# Patient Record
Sex: FEMALE | Race: BLACK OR AFRICAN AMERICAN | NOT HISPANIC OR LATINO | Employment: FULL TIME | URBAN - METROPOLITAN AREA
[De-identification: names, ages, dates, MRNs, and addresses within clinical notes are randomized per-mention and may not be internally consistent; named-entity substitution may affect disease eponyms.]

---

## 2018-01-24 ENCOUNTER — OFFICE VISIT - HEALTHEAST (OUTPATIENT)
Dept: FAMILY MEDICINE | Facility: CLINIC | Age: 58
End: 2018-01-24

## 2018-01-24 DIAGNOSIS — Z12.4 CERVICAL CANCER SCREENING: ICD-10-CM

## 2018-01-24 DIAGNOSIS — Z13.1 DIABETES MELLITUS SCREENING: ICD-10-CM

## 2018-01-24 DIAGNOSIS — Z00.00 ROUTINE GENERAL MEDICAL EXAMINATION AT A HEALTH CARE FACILITY: ICD-10-CM

## 2018-01-24 DIAGNOSIS — Z23 NEED FOR IMMUNIZATION AGAINST INFLUENZA: ICD-10-CM

## 2018-01-24 DIAGNOSIS — Z13.220 SCREENING CHOLESTEROL LEVEL: ICD-10-CM

## 2018-01-24 DIAGNOSIS — R03.0 ELEVATED BLOOD-PRESSURE READING WITHOUT DIAGNOSIS OF HYPERTENSION: ICD-10-CM

## 2018-01-24 LAB
CHOLEST SERPL-MCNC: 218 MG/DL
FASTING STATUS PATIENT QL REPORTED: YES
FASTING STATUS PATIENT QL REPORTED: YES
GLUCOSE BLD-MCNC: 96 MG/DL (ref 70–99)
HDLC SERPL-MCNC: 63 MG/DL
LDLC SERPL CALC-MCNC: 141 MG/DL
TRIGL SERPL-MCNC: 72 MG/DL

## 2018-01-24 ASSESSMENT — MIFFLIN-ST. JEOR: SCORE: 1374.7

## 2018-01-25 LAB
HPV SOURCE: ABNORMAL
HUMAN PAPILLOMA VIRUS 16 DNA: NEGATIVE
HUMAN PAPILLOMA VIRUS 18 DNA: NEGATIVE
HUMAN PAPILLOMA VIRUS FINAL DIAGNOSIS: ABNORMAL
HUMAN PAPILLOMA VIRUS OTHER HR: POSITIVE
SPECIMEN DESCRIPTION: ABNORMAL

## 2018-01-30 LAB
BKR LAB AP ABNORMAL BLEEDING: NO
BKR LAB AP BIRTH CONTROL/HORMONES: ABNORMAL
BKR LAB AP CERVICAL APPEARANCE: NORMAL
BKR LAB AP GYN ADEQUACY: ABNORMAL
BKR LAB AP GYN INTERPRETATION: ABNORMAL
BKR LAB AP HPV REFLEX: ABNORMAL
BKR LAB AP LMP: ABNORMAL
BKR LAB AP PATIENT STATUS: ABNORMAL
BKR LAB AP PREVIOUS ABNORMAL: ABNORMAL
BKR LAB AP PREVIOUS NORMAL: 2015
HIGH RISK?: NO
PATH REPORT.COMMENTS IMP SPEC: ABNORMAL
RESULT FLAG (HE HISTORICAL CONVERSION): ABNORMAL

## 2018-01-31 ENCOUNTER — HOSPITAL ENCOUNTER (OUTPATIENT)
Dept: MAMMOGRAPHY | Facility: CLINIC | Age: 58
Discharge: HOME | End: 2018-01-31
Attending: FAMILY MEDICINE

## 2018-01-31 DIAGNOSIS — Z12.31 VISIT FOR SCREENING MAMMOGRAM: ICD-10-CM

## 2018-02-27 ENCOUNTER — AMBULATORY - HEALTHEAST (OUTPATIENT)
Dept: NURSING | Facility: CLINIC | Age: 58
End: 2018-02-27

## 2018-12-27 ENCOUNTER — AMBULATORY - HEALTHEAST (OUTPATIENT)
Dept: NURSING | Facility: CLINIC | Age: 58
End: 2018-12-27

## 2019-01-17 ENCOUNTER — OFFICE VISIT - HEALTHEAST (OUTPATIENT)
Dept: FAMILY MEDICINE | Facility: CLINIC | Age: 59
End: 2019-01-17

## 2019-01-17 DIAGNOSIS — L24.9 IRRITANT CONTACT DERMATITIS, UNSPECIFIED TRIGGER: ICD-10-CM

## 2019-01-17 DIAGNOSIS — M77.11 EPICONDYLITIS, LATERAL, RIGHT: ICD-10-CM

## 2019-02-08 ENCOUNTER — RECORDS - HEALTHEAST (OUTPATIENT)
Dept: ADMINISTRATIVE | Facility: OTHER | Age: 59
End: 2019-02-08

## 2019-05-01 ENCOUNTER — RECORDS - HEALTHEAST (OUTPATIENT)
Dept: ADMINISTRATIVE | Facility: OTHER | Age: 59
End: 2019-05-01

## 2019-05-02 ENCOUNTER — OFFICE VISIT - HEALTHEAST (OUTPATIENT)
Dept: FAMILY MEDICINE | Facility: CLINIC | Age: 59
End: 2019-05-02

## 2019-05-02 DIAGNOSIS — M21.612 BUNION, LEFT: ICD-10-CM

## 2019-05-02 DIAGNOSIS — Z01.818 PREOP GENERAL PHYSICAL EXAM: ICD-10-CM

## 2019-05-02 LAB — HGB BLD-MCNC: 13.7 G/DL (ref 12–16)

## 2019-05-02 ASSESSMENT — MIFFLIN-ST. JEOR: SCORE: 1407.3

## 2020-09-10 ENCOUNTER — RECORDS - HEALTHEAST (OUTPATIENT)
Dept: ADMINISTRATIVE | Facility: OTHER | Age: 60
End: 2020-09-10

## 2020-09-16 ENCOUNTER — OFFICE VISIT - HEALTHEAST (OUTPATIENT)
Dept: FAMILY MEDICINE | Facility: CLINIC | Age: 60
End: 2020-09-16

## 2020-09-16 DIAGNOSIS — Z00.00 ROUTINE GENERAL MEDICAL EXAMINATION AT A HEALTH CARE FACILITY: ICD-10-CM

## 2020-09-16 DIAGNOSIS — R03.0 ELEVATED BLOOD PRESSURE READING WITHOUT DIAGNOSIS OF HYPERTENSION: ICD-10-CM

## 2020-09-16 DIAGNOSIS — C50.911 MALIGNANT NEOPLASM OF RIGHT FEMALE BREAST, UNSPECIFIED ESTROGEN RECEPTOR STATUS, UNSPECIFIED SITE OF BREAST (H): ICD-10-CM

## 2020-09-16 ASSESSMENT — MIFFLIN-ST. JEOR: SCORE: 1390.29

## 2020-09-25 ENCOUNTER — COMMUNICATION - HEALTHEAST (OUTPATIENT)
Dept: FAMILY MEDICINE | Facility: CLINIC | Age: 60
End: 2020-09-25

## 2020-09-25 LAB

## 2020-09-28 ENCOUNTER — COMMUNICATION - HEALTHEAST (OUTPATIENT)
Dept: FAMILY MEDICINE | Facility: CLINIC | Age: 60
End: 2020-09-28

## 2020-10-01 ENCOUNTER — COMMUNICATION - HEALTHEAST (OUTPATIENT)
Dept: FAMILY MEDICINE | Facility: CLINIC | Age: 60
End: 2020-10-01

## 2020-10-15 ENCOUNTER — OFFICE VISIT - HEALTHEAST (OUTPATIENT)
Dept: FAMILY MEDICINE | Facility: CLINIC | Age: 60
End: 2020-10-15

## 2020-10-15 DIAGNOSIS — I10 ESSENTIAL HYPERTENSION: ICD-10-CM

## 2020-10-15 LAB
ALBUMIN SERPL-MCNC: 3.8 G/DL (ref 3.5–5)
ALP SERPL-CCNC: 114 U/L (ref 45–120)
ALT SERPL W P-5'-P-CCNC: 28 U/L (ref 0–45)
ANION GAP SERPL CALCULATED.3IONS-SCNC: 7 MMOL/L (ref 5–18)
AST SERPL W P-5'-P-CCNC: 24 U/L (ref 0–40)
BILIRUB SERPL-MCNC: 0.3 MG/DL (ref 0–1)
BUN SERPL-MCNC: 13 MG/DL (ref 8–22)
CALCIUM SERPL-MCNC: 8.8 MG/DL (ref 8.5–10.5)
CHLORIDE BLD-SCNC: 108 MMOL/L (ref 98–107)
CHOLEST SERPL-MCNC: 225 MG/DL
CO2 SERPL-SCNC: 29 MMOL/L (ref 22–31)
CREAT SERPL-MCNC: 0.73 MG/DL (ref 0.6–1.1)
FASTING STATUS PATIENT QL REPORTED: YES
GFR SERPL CREATININE-BSD FRML MDRD: >60 ML/MIN/1.73M2
GLUCOSE BLD-MCNC: 99 MG/DL (ref 70–125)
HDLC SERPL-MCNC: 62 MG/DL
LDLC SERPL CALC-MCNC: 149 MG/DL
POTASSIUM BLD-SCNC: 4.3 MMOL/L (ref 3.5–5)
PROT SERPL-MCNC: 7.1 G/DL (ref 6–8)
SODIUM SERPL-SCNC: 144 MMOL/L (ref 136–145)
TRIGL SERPL-MCNC: 71 MG/DL
TSH SERPL DL<=0.005 MIU/L-ACNC: 1.83 UIU/ML (ref 0.3–5)

## 2020-11-06 ENCOUNTER — HOSPITAL ENCOUNTER (OUTPATIENT)
Dept: MAMMOGRAPHY | Facility: CLINIC | Age: 60
Discharge: HOME | End: 2020-11-06

## 2020-11-06 DIAGNOSIS — Z12.31 VISIT FOR SCREENING MAMMOGRAM: ICD-10-CM

## 2020-12-03 ENCOUNTER — RECORDS - HEALTHEAST (OUTPATIENT)
Dept: ADMINISTRATIVE | Facility: OTHER | Age: 60
End: 2020-12-03

## 2021-01-12 ENCOUNTER — OFFICE VISIT - HEALTHEAST (OUTPATIENT)
Dept: FAMILY MEDICINE | Facility: CLINIC | Age: 61
End: 2021-01-12

## 2021-01-12 DIAGNOSIS — I10 ESSENTIAL HYPERTENSION: ICD-10-CM

## 2021-02-08 ENCOUNTER — COMMUNICATION - HEALTHEAST (OUTPATIENT)
Dept: FAMILY MEDICINE | Facility: CLINIC | Age: 61
End: 2021-02-08

## 2021-02-08 DIAGNOSIS — I10 ESSENTIAL HYPERTENSION: ICD-10-CM

## 2021-02-10 ENCOUNTER — OFFICE VISIT - HEALTHEAST (OUTPATIENT)
Dept: FAMILY MEDICINE | Facility: CLINIC | Age: 61
End: 2021-02-10

## 2021-02-10 DIAGNOSIS — I10 ESSENTIAL HYPERTENSION: ICD-10-CM

## 2021-02-10 RX ORDER — LOSARTAN POTASSIUM 50 MG/1
50 TABLET ORAL DAILY
Qty: 90 TABLET | Refills: 1 | Status: SHIPPED | OUTPATIENT
Start: 2021-02-10 | End: 2021-08-11

## 2021-03-17 ENCOUNTER — COMMUNICATION - HEALTHEAST (OUTPATIENT)
Dept: FAMILY MEDICINE | Facility: CLINIC | Age: 61
End: 2021-03-17

## 2021-04-02 ENCOUNTER — AMBULATORY - HEALTHEAST (OUTPATIENT)
Dept: NURSING | Facility: CLINIC | Age: 61
End: 2021-04-02

## 2021-04-23 ENCOUNTER — AMBULATORY - HEALTHEAST (OUTPATIENT)
Dept: NURSING | Facility: CLINIC | Age: 61
End: 2021-04-23

## 2021-05-01 ENCOUNTER — HEALTH MAINTENANCE LETTER (OUTPATIENT)
Age: 61
End: 2021-05-01

## 2021-05-17 ENCOUNTER — COMMUNICATION - HEALTHEAST (OUTPATIENT)
Dept: FAMILY MEDICINE | Facility: CLINIC | Age: 61
End: 2021-05-17

## 2021-05-17 DIAGNOSIS — I10 ESSENTIAL HYPERTENSION: ICD-10-CM

## 2021-05-17 RX ORDER — HYDROCHLOROTHIAZIDE 25 MG/1
25 TABLET ORAL DAILY
Qty: 90 TABLET | Refills: 1 | Status: SHIPPED | OUTPATIENT
Start: 2021-05-17 | End: 2021-09-16

## 2021-05-25 ENCOUNTER — RECORDS - HEALTHEAST (OUTPATIENT)
Dept: ADMINISTRATIVE | Facility: CLINIC | Age: 61
End: 2021-05-25

## 2021-05-26 ENCOUNTER — RECORDS - HEALTHEAST (OUTPATIENT)
Dept: ADMINISTRATIVE | Facility: CLINIC | Age: 61
End: 2021-05-26

## 2021-05-27 ENCOUNTER — RECORDS - HEALTHEAST (OUTPATIENT)
Dept: ADMINISTRATIVE | Facility: CLINIC | Age: 61
End: 2021-05-27

## 2021-05-28 ENCOUNTER — RECORDS - HEALTHEAST (OUTPATIENT)
Dept: ADMINISTRATIVE | Facility: CLINIC | Age: 61
End: 2021-05-28

## 2021-05-28 NOTE — PROGRESS NOTES
Preoperative Exam    Scheduled Procedure: bunion left foot  Surgery Date:  5/20/19   [facility] fax [phone]    Surgeon:  Dr.Jeff Young    Assessment/Plan:     Preop general physical exam, Vito, left  -     Hemoglobin  Surgical Procedure Risk: Low (reported cardiac risk generally < 1%)  Have you had prior anesthesia?: Yes  Have you or any family members had a previous anesthesia reaction:  No  Do you or any family members have a history of a clotting or bleeding disorder?: No  Cardiac Risk Assessment: no increased risk for major cardiac complications  Avoid NSAIDs, SAS, fish oil, vitamins    Patient approved for surgery with general or local anesthesia.    Functional Status: Independent  Patient plans to recover at home with family.     Subjective:      Sierra Danielle is a 59 y.o. female who presents for a preoperative consultation.  She has no questions or concerns today.  She had bunion surgery on the right foot many years ago.  She did not have any complications then.  She has had surgeries in the past without any problems with anesthesia or bleeding.  She currently takes no medications.  All her health maintenance screenings are up-to-date.    All other systems reviewed and are negative, other than those listed in the HPI.    Pertinent History  Do you have difficulty breathing or chest pain after walking up a flight of stairs: No  History of obstructive sleep apnea: No  Steroid use in the last 6 months: No  Frequent Aspirin/NSAID use: No  Prior Blood Transfusion: No  Prior Blood Transfusion Reaction: No  If for some reason prior to, during or after the procedure, if it is medically indicated, would you be willing to have a blood transfusion?:  There is no transfusion refusal.    No current outpatient medications on file.     No current facility-administered medications for this visit.         No Known Allergies    Patient Active Problem List   Diagnosis      Allergies     Obesity       Past Medical History:   Diagnosis Date     Breast cancer (H) 2006     Heart murmur        Past Surgical History:   Procedure Laterality Date     BREAST BIOPSY Right 2005     BREAST LUMPECTOMY       CHOLECYSTECTOMY       [place] EXCISE BREAST CYST      Description: Breast Surgery Lumpectomy;  Recorded: 05/01/2012;     [place] LAP,CHOLECYSTECTOMY      Description: Cholecystectomy Laparoscopic;  Recorded: 11/17/2008;       Social History     Socioeconomic History     Marital status:      Spouse name: Not on file     Number of children: Not on file     Years of education: Not on file     Highest education level: Not on file   Occupational History     Not on file   Social Needs     Financial resource strain: Not on file     Food insecurity:     Worry: Not on file     Inability: Not on file     Transportation needs:     Medical: Not on file     Non-medical: Not on file   Tobacco Use     Smoking status: Never Smoker     Smokeless tobacco: Never Used   Substance and Sexual Activity     Alcohol use: Not on file     Drug use: Not on file     Sexual activity: Not on file   Lifestyle     Physical activity:     Days per week: Not on file     Minutes per session: Not on file     Stress: Not on file   Relationships     Social connections:     Talks on phone: Not on file     Gets together: Not on file     Attends Rastafari service: Not on file     Active member of club or organization: Not on file     Attends meetings of clubs or organizations: Not on file     Relationship status: Not on file     Intimate partner violence:     Fear of current or ex partner: Not on file     Emotionally abused: Not on file     Physically abused: Not on file     Forced sexual activity: Not on file   Other Topics Concern     Not on file   Social History Narrative     Not on file       Patient Care Team:  Taryn Cuellar MD as PCP - General          Objective:     Vitals:    05/02/19 1307   BP: 132/84   Pulse: 74  "  SpO2: 99%   Weight: 196 lb (88.9 kg)   Height: 5' 2\" (1.575 m)         Physical Exam:    Objective:    Physical Exam   Vitals:    05/02/19 1307   BP: 132/84   Pulse: 74   SpO2: 99%      Constitutional: Patient is oriented to person, place, and time. Patient appears well-developed and well-nourished. No distress.   Head: Normocephalic and atraumatic.   Right Ear: External ear normal. Normal TM  Left Ear: External ear normal. Normal TM  Nose: Nose normal.   Mouth/Throat: Oropharynx is clear and moist. No oropharyngeal exudate.   Eyes: Conjunctivae and EOM are normal. Pupils are equal, round, and reactive to light. Right eye exhibits no discharge. Left eye exhibits no discharge. No scleral icterus.   Neck: Neck supple. No JVD present. No tracheal deviation present. No thyromegaly present.   Cardiovascular: Normal rate, regular rhythm, normal heart sounds and intact distal pulses. No murmur heard.   Pulmonary/Chest: Effort normal and breath sounds normal. No stridor. No respiratory distress. Patient has no wheezes, no rales, exhibits no tenderness.   Abdominal: Soft. Bowel sounds are normal. Patient exhibits no distension and no mass. There is no tenderness. There is no rebound and no guarding.   Lymphadenopathy:  Patient has no cervical adenopathy.   Neurological: Patient is alert and oriented to person, place, and time. Patient has normal reflexes. No cranial nerve deficit. Coordination normal.   Skin: Skin is warm and dry. No rash noted. Patient is not diaphoretic. No erythema. No pallor.   Bunion left foot    Results for orders placed or performed in visit on 05/02/19   Hemoglobin   Result Value Ref Range    Hemoglobin 13.7 12.0 - 16.0 g/dL      Immunization History   Administered Date(s) Administered     DT (pediatric) 08/12/2004     INFLUENZA,RECOMBINANT,INJ,PF QUADRIVALENT 18+YRS 12/27/2018     Influenza, seasonal,quad inj 36+ mos 01/24/2018     Td,adult,historic,unspecified 08/12/2004     Tdap 01/14/2014 "       Electronically signed by Taryn Velarde MD 05/02/19 1:03 PM

## 2021-05-29 ENCOUNTER — RECORDS - HEALTHEAST (OUTPATIENT)
Dept: ADMINISTRATIVE | Facility: CLINIC | Age: 61
End: 2021-05-29

## 2021-05-30 ENCOUNTER — RECORDS - HEALTHEAST (OUTPATIENT)
Dept: ADMINISTRATIVE | Facility: CLINIC | Age: 61
End: 2021-05-30

## 2021-05-31 ENCOUNTER — RECORDS - HEALTHEAST (OUTPATIENT)
Dept: ADMINISTRATIVE | Facility: CLINIC | Age: 61
End: 2021-05-31

## 2021-05-31 VITALS — BODY MASS INDEX: 35.07 KG/M2 | HEIGHT: 62 IN | WEIGHT: 190.56 LBS

## 2021-06-03 VITALS — HEIGHT: 62 IN | WEIGHT: 196 LBS | BODY MASS INDEX: 36.07 KG/M2

## 2021-06-05 VITALS
WEIGHT: 194.44 LBS | SYSTOLIC BLOOD PRESSURE: 154 MMHG | DIASTOLIC BLOOD PRESSURE: 94 MMHG | HEART RATE: 82 BPM | OXYGEN SATURATION: 98 % | BODY MASS INDEX: 36.14 KG/M2

## 2021-06-05 VITALS
SYSTOLIC BLOOD PRESSURE: 150 MMHG | HEART RATE: 89 BPM | HEIGHT: 62 IN | BODY MASS INDEX: 35.7 KG/M2 | WEIGHT: 194 LBS | OXYGEN SATURATION: 98 % | DIASTOLIC BLOOD PRESSURE: 88 MMHG

## 2021-06-05 VITALS
WEIGHT: 191.31 LBS | SYSTOLIC BLOOD PRESSURE: 140 MMHG | OXYGEN SATURATION: 99 % | DIASTOLIC BLOOD PRESSURE: 108 MMHG | HEART RATE: 71 BPM | TEMPERATURE: 98.5 F | BODY MASS INDEX: 35.56 KG/M2

## 2021-06-05 VITALS
BODY MASS INDEX: 36.45 KG/M2 | WEIGHT: 196.06 LBS | OXYGEN SATURATION: 99 % | TEMPERATURE: 98.3 F | HEART RATE: 78 BPM | DIASTOLIC BLOOD PRESSURE: 102 MMHG | SYSTOLIC BLOOD PRESSURE: 152 MMHG

## 2021-06-11 NOTE — PROGRESS NOTES
1/24/18 ASCUS, +HR HPV (not 16/18)  9/16/20 NIL pap, +HR HPV (not 16/18). Plan: cotest in 1 year per provider

## 2021-06-12 NOTE — PROGRESS NOTES
ASSESSMENT/PLAN:  Sierra was seen today for follow-up and bloodwork.    Diagnoses and all orders for this visit:    Essential hypertension  -     Lipid Cascade  -     Comprehensive Metabolic Panel  -     Thyroid Cascade  -     lisinopriL (PRINIVIL,ZESTRIL) 10 MG tablet; Take 1 tablet (10 mg total) by mouth daily.  Counseled healthy lifestyle modifications  Discussed intermittent fasting  F/u in 3 months    SUBJECTIVE:    Sierra Danielle is a 60 y.o. female who came in today     Here for BP recheck  Since last appointment she has eaten out less and cooking more.  Limiting salt intake.  Has lost few lbs  FMH of HTN  No chest pain, shortness of breath, HA    Review of Systems (except those mentioned above)  Constitutional: Negative.   HENT: Negative.   Eyes: Negative.   Respiratory: Negative.   Cardiovascular: Negative.   Gastrointestinal: Negative.   Endocrine: Negative.   Genitourinary: Negative.   Musculoskeletal: Negative.   Skin: Negative.   Allergic/Immunologic: Negative.   Neurological: Negative.   Hematological: Negative.   Psychiatric/Behavioral: Negative.     Patient Active Problem List    Diagnosis Date Noted     breast cancer right, s/p lumpectomy 09/16/2020     ASCUS with positive high risk HPV cervical 01/24/2018     Allergies      No Known Allergies  Current Outpatient Medications   Medication Sig Dispense Refill     lisinopriL (PRINIVIL,ZESTRIL) 10 MG tablet Take 1 tablet (10 mg total) by mouth daily. 90 tablet 0     No current facility-administered medications for this visit.      Past Medical History:   Diagnosis Date     ASCUS with positive high risk HPV cervical 1/24/2018     Breast cancer (H) 2006     Heart murmur      Past Surgical History:   Procedure Laterality Date     BREAST BIOPSY Right 2005     BREAST LUMPECTOMY       CHOLECYSTECTOMY       [place] EXCISE BREAST CYST      Description: Breast Surgery Lumpectomy;  Recorded: 05/01/2012;     [place] LAP,CHOLECYSTECTOMY      Description:  Cholecystectomy Laparoscopic;  Recorded: 11/17/2008;     Social History     Socioeconomic History     Marital status:      Spouse name: None     Number of children: None     Years of education: None     Highest education level: None   Occupational History     None   Social Needs     Financial resource strain: None     Food insecurity     Worry: None     Inability: None     Transportation needs     Medical: None     Non-medical: None   Tobacco Use     Smoking status: Never Smoker     Smokeless tobacco: Never Used   Substance and Sexual Activity     Alcohol use: None     Drug use: None     Sexual activity: None   Lifestyle     Physical activity     Days per week: None     Minutes per session: None     Stress: None   Relationships     Social connections     Talks on phone: None     Gets together: None     Attends Latter-day service: None     Active member of club or organization: None     Attends meetings of clubs or organizations: None     Relationship status: None     Intimate partner violence     Fear of current or ex partner: None     Emotionally abused: None     Physically abused: None     Forced sexual activity: None   Other Topics Concern     None   Social History Narrative     None     Family History   Problem Relation Age of Onset     Breast cancer Paternal Aunt 60     Coronary artery disease Father      Pancreatic cancer Mother          OBJECTIVE:    Vitals:    10/15/20 0821 10/15/20 0853   BP: (!) 144/100 (!) 140/108   Patient Site: Left Arm    Patient Position: Sitting    Cuff Size: Adult Regular    Pulse: 71    Temp: 98.5  F (36.9  C)    SpO2: 99%    Weight: 191 lb 5 oz (86.8 kg)      Body mass index is 35.56 kg/m .    Physical Exam:  Constitutional: Patient is oriented to person, place, and time. Patient appears well-developed and well-nourished. No distress.   Head: Normocephalic and atraumatic.   Right Ear: External ear normal.   Left Ear: External ear normal.   Nose: Nose normal.   Eyes:  Conjunctivae and EOM are normal. Right eye exhibits no discharge. Left eye exhibits no discharge. No scleral icterus.   Neurological: Patient is alert and oriented to person, place, and time. No cranial nerve deficit. Coordination normal.   Skin: No rash noted. Patient is not diaphoretic. No erythema. No pallor.    Results for orders placed or performed in visit on 09/16/20   Gynecologic Cytology (PAP Smear)   Result Value Ref Range    Case Report       Gynecologic Cytology Report                       Case: [id number]                                   Authorizing Provider:  Taryn Cuellar       Collected:           09/16/2020 0941                                     MD Chrissy                                                                  Ordering Location:     [facility]   Received:            09/16/2020 0941                                     Family Medicine/OB                                                           First Screen:          Naomie Shipman CT                                                                               (ASCP)                                                                       Rescreen:              Alexus Aguilar CT                                                                           (ASCP)                                                                       Specimen:    SUREPATH PAP, SCREENING, Endocervical/cervical                                             Interpretation  Negative for squamous intraepithelial lesion or malignancy.      Negative for squamous intraepithelial lesion or malignancy    Result Flag Normal Normal    Other Findings       Shift in vaginal yamila suggestive of bacterial vaginosis    Specimen Adequacy       Satisfactory for evaluation, endocervical/transformation zone component present    HPV Reflex? Yes regardless of result     HIGH RISK No     LMP/Menopause Date postmenopausal     Abnormal Bleeding No     Pt  Status na     Birth Control/Hormones None     Previous Normal/Date .     Prev Abn Date/Dx 2018     Cervical Appearance normal    HPV High Risk DNA Cervical   Result Value Ref Range    HPV Source [facility]     HPV16 DNA Negative NEG    HPV18 DNA Negative NEG    Other HR HPV Positive (!) NEG    Final Diagnosis SEE NOTES     Specimen Description Cervical Cells

## 2021-06-14 NOTE — PROGRESS NOTES
ASSESSMENT/PLAN:  Sierra was seen today for med check and medication refill.    Diagnoses and all orders for this visit:    Essential hypertension  Stop lisinopril due to cough  Start hydrochlorothiazide 25mg  Check BP at home  F/u in 1 month  -     hydroCHLOROthiazide (HYDRODIURIL) 25 MG tablet; Take 1 tablet (25 mg total) by mouth daily.  -     blood pressure monitor Kit; Check blood pressure daily      SUBJECTIVE:    Sierra Danielle is a 60 y.o. female who came in today     Here for HTN f/u  Started lisinopril in October  Tickle in the throat and cough.  Associated HA.  No chest pain  No SOB    Review of Systems (except those mentioned above)  Constitutional: Negative.   HENT: Negative.   Eyes: Negative.   Respiratory: Negative.   Cardiovascular: Negative.   Gastrointestinal: Negative.   Endocrine: Negative.   Genitourinary: Negative.   Musculoskeletal: Negative.   Skin: Negative.   Allergic/Immunologic: Negative.   Neurological: Negative.   Hematological: Negative.   Psychiatric/Behavioral: Negative.     Patient Active Problem List    Diagnosis Date Noted     breast cancer right, s/p lumpectomy 09/16/2020     ASCUS with positive high risk HPV cervical 01/24/2018     Allergies      No Known Allergies  Current Outpatient Medications   Medication Sig Dispense Refill     lisinopriL (PRINIVIL,ZESTRIL) 10 MG tablet Take 1 tablet (10 mg total) by mouth daily. 90 tablet 0     blood pressure monitor Kit Check blood pressure daily 1 each 0     hydroCHLOROthiazide (HYDRODIURIL) 25 MG tablet Take 1 tablet (25 mg total) by mouth daily. 30 tablet 0     No current facility-administered medications for this visit.      Past Medical History:   Diagnosis Date     ASCUS with positive high risk HPV cervical 1/24/2018     Breast cancer (H) 2006     Heart murmur      Past Surgical History:   Procedure Laterality Date     BREAST BIOPSY Right 2005     BREAST LUMPECTOMY       CHOLECYSTECTOMY       [place] EXCISE BREAST CYST       Description: Breast Surgery Lumpectomy;  Recorded: 05/01/2012;     [place] LAP,CHOLECYSTECTOMY      Description: Cholecystectomy Laparoscopic;  Recorded: 11/17/2008;     Social History     Socioeconomic History     Marital status:      Spouse name: None     Number of children: None     Years of education: None     Highest education level: None   Occupational History     None   Social Needs     Financial resource strain: None     Food insecurity     Worry: None     Inability: None     Transportation needs     Medical: None     Non-medical: None   Tobacco Use     Smoking status: Never Smoker     Smokeless tobacco: Never Used   Substance and Sexual Activity     Alcohol use: None     Drug use: None     Sexual activity: None   Lifestyle     Physical activity     Days per week: None     Minutes per session: None     Stress: None   Relationships     Social connections     Talks on phone: None     Gets together: None     Attends Temple service: None     Active member of club or organization: None     Attends meetings of clubs or organizations: None     Relationship status: None     Intimate partner violence     Fear of current or ex partner: None     Emotionally abused: None     Physically abused: None     Forced sexual activity: None   Other Topics Concern     None   Social History Narrative     None     Family History   Problem Relation Age of Onset     Breast cancer Paternal Aunt 60     Coronary artery disease Father      Pancreatic cancer Mother          OBJECTIVE:    Vitals:    01/12/21 0930 01/12/21 0949   BP: (!) 152/102 (!) 152/102   Patient Site: Left Arm Left Arm   Patient Position: Sitting Sitting   Cuff Size: Adult Regular Adult Regular   Pulse: 78    Temp: 98.3  F (36.8  C)    TempSrc: Oral    SpO2: 99%    Weight: 196 lb 1 oz (88.9 kg)      Body mass index is 36.45 kg/m .    Physical Exam:  Constitutional: Patient is oriented to person, place, and time. Patient appears well-developed and well-nourished. No  distress.   Head: Normocephalic and atraumatic.   Right Ear: External ear normal.   Left Ear: External ear normal.   Nose: Nose normal.   Eyes: Conjunctivae and EOM are normal. Right eye exhibits no discharge. Left eye exhibits no discharge. No scleral icterus.   Neurological: Patient is alert and oriented to person, place, and time. No cranial nerve deficit. Coordination normal.   Skin: No rash noted. Patient is not diaphoretic. No erythema. No pallor.      Results for orders placed or performed in visit on 10/15/20   Lipid Cascade   Result Value Ref Range    Cholesterol 225 (H) <=199 mg/dL    Triglycerides 71 <=149 mg/dL    HDL Cholesterol 62 >=50 mg/dL    LDL Calculated 149 (H) <=129 mg/dL    Patient Fasting > 8hrs? Yes    Comprehensive Metabolic Panel   Result Value Ref Range    Sodium 144 136 - 145 mmol/L    Potassium 4.3 3.5 - 5.0 mmol/L    Chloride 108 (H) 98 - 107 mmol/L    CO2 29 22 - 31 mmol/L    Anion Gap, Calculation 7 5 - 18 mmol/L    Glucose 99 70 - 125 mg/dL    BUN 13 8 - 22 mg/dL    Creatinine 0.73 0.60 - 1.10 mg/dL    GFR MDRD Af Amer >60 >60 mL/min/1.73m2    GFR MDRD Non Af Amer >60 >60 mL/min/1.73m2    Bilirubin, Total 0.3 0.0 - 1.0 mg/dL    Calcium 8.8 8.5 - 10.5 mg/dL    Protein, Total 7.1 6.0 - 8.0 g/dL    Albumin 3.8 3.5 - 5.0 g/dL    Alkaline Phosphatase 114 45 - 120 U/L    AST 24 0 - 40 U/L    ALT 28 0 - 45 U/L   Thyroid Cascade   Result Value Ref Range    TSH 1.83 0.30 - 5.00 uIU/mL

## 2021-06-15 NOTE — PROGRESS NOTES
ASSESSMENT/PLAN:  Sierra was seen today for med check and medication refill.    Diagnoses and all orders for this visit:    Essential hypertension  Cough is better since discontinuation of lisinopril  Blood pressure still elevated  Add losartan 50 mg  Continue with hydrochlorothiazide 25 mg  Check blood pressure at home and call if her numbers are consistently above 140/90.  Follow-up in 6 months  Counseled healthy lifestyle modifications  Motivational interviewing was utilized today.  Modified cognitive behavioral therapy was performed with counseling on internal locus of control.  Discussed importance of self care, sleep, nutrition, hydration, exercise, and mindfulness      -     losartan (COZAAR) 50 MG tablet; Take 1 tablet (50 mg total) by mouth daily.  -     hydroCHLOROthiazide (HYDRODIURIL) 25 MG tablet; Take 1 tablet (25 mg total) by mouth daily.    SUBJECTIVE:    Sierra Danielle is a 60 y.o. female who came in today     Here for blood pressure check.  Cough is better since we discontinue lisinopril.  She is on hydrochlorothiazide 25 mg.  Other than frequent urination and mild headaches, she is tolerating hydrochlorothiazide.  Blood pressure still elevated.  Has been working on healthy lifestyle modification.  She is cooking at home rather than going out to eat.  Working on psychotherapy with regards to her stressors.    Review of Systems (except those mentioned above)  Constitutional: Negative.   HENT: Negative.   Eyes: Negative.   Respiratory: Negative.   Cardiovascular: Negative.   Gastrointestinal: Negative.   Endocrine: Negative.   Genitourinary: Negative.   Musculoskeletal: Negative.   Skin: Negative.   Allergic/Immunologic: Negative.   Neurological: Negative.   Hematological: Negative.   Psychiatric/Behavioral: Negative.     Patient Active Problem List    Diagnosis Date Noted     Essential hypertension 02/10/2021     breast cancer right, s/p lumpectomy 09/16/2020     ASCUS with positive high risk  HPV cervical 01/24/2018     Allergies      No Known Allergies  Current Outpatient Medications   Medication Sig Dispense Refill     hydroCHLOROthiazide (HYDRODIURIL) 25 MG tablet Take 1 tablet (25 mg total) by mouth daily. 90 tablet 1     losartan (COZAAR) 50 MG tablet Take 1 tablet (50 mg total) by mouth daily. 90 tablet 1     No current facility-administered medications for this visit.      Past Medical History:   Diagnosis Date     ASCUS with positive high risk HPV cervical 1/24/2018     Breast cancer (H) 2006     Heart murmur      Past Surgical History:   Procedure Laterality Date     BREAST BIOPSY Right 2005     BREAST LUMPECTOMY       CHOLECYSTECTOMY       [place] EXCISE BREAST CYST      Description: Breast Surgery Lumpectomy;  Recorded: 05/01/2012;     [place] LAP,CHOLECYSTECTOMY      Description: Cholecystectomy Laparoscopic;  Recorded: 11/17/2008;     Social History     Socioeconomic History     Marital status:      Spouse name: None     Number of children: None     Years of education: None     Highest education level: None   Occupational History     None   Social Needs     Financial resource strain: None     Food insecurity     Worry: None     Inability: None     Transportation needs     Medical: None     Non-medical: None   Tobacco Use     Smoking status: Never Smoker     Smokeless tobacco: Never Used   Substance and Sexual Activity     Alcohol use: None     Drug use: None     Sexual activity: None   Lifestyle     Physical activity     Days per week: None     Minutes per session: None     Stress: None   Relationships     Social connections     Talks on phone: None     Gets together: None     Attends Holiness service: None     Active member of club or organization: None     Attends meetings of clubs or organizations: None     Relationship status: None     Intimate partner violence     Fear of current or ex partner: None     Emotionally abused: None     Physically abused: None     Forced sexual activity: None    Other Topics Concern     None   Social History Narrative     None     Family History   Problem Relation Age of Onset     Breast cancer Paternal Aunt 60     Coronary artery disease Father      Pancreatic cancer Mother          OBJECTIVE:    Vitals:    02/10/21 1338 02/10/21 1356   BP: (!) 148/92 (!) 154/94   Patient Site: Left Arm Left Arm   Patient Position: Sitting Sitting   Cuff Size: Adult Regular Adult Regular   Pulse: 82    SpO2: 98%    Weight: 194 lb 7 oz (88.2 kg)      Body mass index is 36.14 kg/m .    Physical Exam:  Constitutional: Patient is oriented to person, place, and time. Patient appears well-developed and well-nourished. No distress.   Head: Normocephalic and atraumatic.   Right Ear: External ear normal.   Left Ear: External ear normal.   Eyes: Conjunctivae and EOM are normal. Right eye exhibits no discharge. Left eye exhibits no discharge. No scleral icterus.   Neurological: Patient is alert and oriented to person, place, and time. No cranial nerve deficit. Coordination normal.   Skin: No rash noted. Patient is not diaphoretic. No erythema. No pallor.    Results for orders placed or performed in visit on 10/15/20   Lipid Cascade   Result Value Ref Range    Cholesterol 225 (H) <=199 mg/dL    Triglycerides 71 <=149 mg/dL    HDL Cholesterol 62 >=50 mg/dL    LDL Calculated 149 (H) <=129 mg/dL    Patient Fasting > 8hrs? Yes    Comprehensive Metabolic Panel   Result Value Ref Range    Sodium 144 136 - 145 mmol/L    Potassium 4.3 3.5 - 5.0 mmol/L    Chloride 108 (H) 98 - 107 mmol/L    CO2 29 22 - 31 mmol/L    Anion Gap, Calculation 7 5 - 18 mmol/L    Glucose 99 70 - 125 mg/dL    BUN 13 8 - 22 mg/dL    Creatinine 0.73 0.60 - 1.10 mg/dL    GFR MDRD Af Amer >60 >60 mL/min/1.73m2    GFR MDRD Non Af Amer >60 >60 mL/min/1.73m2    Bilirubin, Total 0.3 0.0 - 1.0 mg/dL    Calcium 8.8 8.5 - 10.5 mg/dL    Protein, Total 7.1 6.0 - 8.0 g/dL    Albumin 3.8 3.5 - 5.0 g/dL    Alkaline Phosphatase 114 45 - 120 U/L     AST 24 0 - 40 U/L    ALT 28 0 - 45 U/L   Thyroid Cascade   Result Value Ref Range    TSH 1.83 0.30 - 5.00 uIU/mL

## 2021-06-15 NOTE — PROGRESS NOTES
ASSESSMENT/PLAN:  Routine general medical examination at a health care facility  We discussed healthy lifestyle, nutrition, cardiovascular risk reduction, self care, safety, sunscreen, and seatbelt use.  Health maintenance screening and immunizations reviewed with the patient.    Screening cholesterol level  -     Lipid Cascade    Diabetes mellitus screening  -     Glucose    Cervical cancer screening  -     Gynecologic Cytology (PAP Smear)    Need for immunization against influenza  -     Influenza, Seasonal,Quad Inj, 36+ MOS    Elevated blood-pressure reading without diagnosis of hypertension  Come back in 2 wks for a nurse only BP recheck  Work on healthy lifestyle modifications        CHIEF COMPLAINT:  Chief Complaint   Patient presents with     Annual Exam     Px, Pt is fasting       SUBJECTIVE:  Sierra Danielle is a 57 y.o. female who is here for a health maintenance visit. Her blood pressure is elevated today at 130/100. Her pulse is within normal limits. Her BMI is 35. She is fasting today. She is due for a PAP smear; no history of abnormal PAP smear. She is due for a mammogram. Her colonoscopy is up to date. Immunizations are up to date. She would like a flu shot today.      REVIEW OF SYSTEMS:   No more problems with chest pain since 2016. Working on starting new eating habits, and healthy lifestyle. All other systems are negative.    PFSH:  No new history.     History   Smoking Status     Never Smoker   Smokeless Tobacco     Never Used       Family History   Problem Relation Age of Onset     Breast cancer Paternal Aunt 60     Coronary artery disease Father      Pancreatic cancer Mother        Past Surgical History:   Procedure Laterality Date     BREAST BIOPSY Right 2005     BREAST LUMPECTOMY       CHOLECYSTECTOMY       RI EXCISE BREAST CYST      Description: Breast Surgery Lumpectomy;  Recorded: 05/01/2012;     RI LAP,CHOLECYSTECTOMY      Description: Cholecystectomy Laparoscopic;  Recorded: 11/17/2008;  "      No Known Allergies      OBJECTIVE:   Physical Exam   Vitals:    01/24/18 0908 01/24/18 0939   BP: (!) 130/100 (!) 136/100   Pulse: 72    Weight: 190 lb 9 oz (86.4 kg)    Height: 5' 1.5\" (1.562 m)      Estimated body mass index is 35.42 kg/(m^2) as calculated from the following:    Height as of this encounter: 5' 1.5\" (1.562 m).    Weight as of this encounter: 190 lb 9 oz (86.4 kg).    Constitutional: Patient is oriented to person, place, and time. Patient appears well-developed and well-nourished. No distress.   Head: Normocephalic and atraumatic.   Right Ear: External ear normal. Ear canal and TM normal.   Left Ear: External ear normal. Ear canal and TM normal.   Nose: Nose normal.   Mouth/Throat: Oropharynx is clear and moist. No oropharyngeal exudate.   Eyes: Conjunctivae and EOM are normal. Pupils are equal, round, and reactive to light. Right eye exhibits no discharge. Left eye exhibits no discharge. No scleral icterus.   Neck: Neck supple. No JVD present. No tracheal deviation present. No thyromegaly present.   Breasts:  Normal appearing, no skin involvement, no palpable mass, no tenderness on palpation.  No axillary involvement  Cardiovascular: Normal rate, regular rhythm, normal heart sounds and intact distal pulses. No murmur heard.   Pulmonary/Chest: Effort normal and breath sounds normal. No stridor. No respiratory distress. Patient has no wheezes, no rales, exhibits no tenderness.   Abdominal: Soft. Bowel sounds are normal. Patient exhibits no distension and no mass. There is no tenderness. There is no rebound and no guarding.   Lymphadenopathy:  Patient has no cervical adenopathy.   Neurological: Patient is alert and oriented to person, place, and time. Patient has normal reflexes. No cranial nerve deficit. Coordination normal.   Skin: Skin is warm and dry. No rash noted. Patient is not diaphoretic. No erythema. No pallor.   Pelvic:  Normal external genitalia with Normal vulva.  Normal vagina with " no polyps or lesions and with physiologic discharge.  Normal cervix with normal mucosa and without CMT.  No adnexal masses  Psychiatric: Patient has good eye contact without any psychomotor retardation or stereotypic behaviors.  normal mood and affect. Judgment and thought content normal.   Speech is regular rate and rhythm.       QUALITY MEASURES:  The following high BMI interventions were performed this visit: encouragement to exercise and lifestyle education regarding diet    ADDITIONAL HISTORY SUMMARIZED (2): Reviewed normal colonoscopy from 2012.  DECISION TO OBTAIN EXTRA INFORMATION (1): None.   RADIOLOGY TESTS (1): None.  LABS (1): Ordered labs today.  MEDICINE TESTS (1): Reviewed normal echocardiogram from 9/14/2016.  INDEPENDENT REVIEW (2 each): None.     The visit lasted a total of 17 minutes face to face with the patient. Over 50% of the time was spent counseling and educating the patient about health maintenance.    I, Kaylah Sanchez, am scribing for and in the presence of, Dr. Dukes.    ITaryn MD , personally performed the services described in this documentation, as scribed by Kaylah Sanchez in my presence, and it is both accurate and complete.      MEDICATIONS:  No current outpatient prescriptions on file.     No current facility-administered medications for this visit.          Total data points: 4

## 2021-06-15 NOTE — TELEPHONE ENCOUNTER
Refill request for medication: Hydrochlorothiazide  Last visit addressing this medication: 1/12/2021  Follow up plan 1  months  Last refill on 1/12/21, quantity #30   CSA completed N/A   checked  02/08/21, last dispensed refill N/A    Appointment: Appointment scheduled for 2/10/21     Luanne Smith, CMA

## 2021-06-16 PROBLEM — C50.911 MALIGNANT NEOPLASM OF RIGHT FEMALE BREAST, UNSPECIFIED ESTROGEN RECEPTOR STATUS, UNSPECIFIED SITE OF BREAST (H): Status: ACTIVE | Noted: 2020-09-16

## 2021-06-16 PROBLEM — I10 ESSENTIAL HYPERTENSION: Status: ACTIVE | Noted: 2021-02-10

## 2021-06-16 PROBLEM — R87.810 ASCUS WITH POSITIVE HIGH RISK HPV CERVICAL: Status: ACTIVE | Noted: 2018-01-24

## 2021-06-16 PROBLEM — R87.610 ASCUS WITH POSITIVE HIGH RISK HPV CERVICAL: Status: ACTIVE | Noted: 2018-01-24

## 2021-06-16 NOTE — PROGRESS NOTES
I met with Sierra Danielle at the request of Roseline to recheck her blood pressure.  Blood pressure medications on the MAR were reviewed with patient.    Patient has taken all medications as per usual regimen: N/A  Patient reports tolerating them without any issues or concerns: N/A    Vitals:    02/27/18 0818   BP: 132/76       Blood pressure was taken, previous encounter was reviewed, recorded blood pressure below 140/90.  Patient was discharged and the note will be sent to the provider for final review.

## 2021-06-20 NOTE — LETTER
Letter by Taryn Cuellar MD at      Author: Taryn Cuellar MD Service: -- Author Type: --    Filed:  Encounter Date: 10/1/2020 Status: (Other)         Sierra VÍCTOR Lolis  [address]  [facility] [address]             October 1, 2020         Dear Ms. Danielle,    This letter is regarding your recent Pap smear and Human Papillomavirus (HPV) test.    Your results showed a normal Pap smear and HPV positive.     About 80 percent of women have been exposed to HPV throughout their lifetime. There is no medication for the treatment of HPV. Typically, your own immune system gets rid of the virus before it does harm. HPV is spread by direct skin-to-skin contact, including sexual intercourse, oral sex, anal sex, or any other contact involving the genital area (example: hand to genital contact). It is not possible to become infected with HPV by touching an object, such as a toilet seat. Most people who are infected with HPV have no signs or symptoms.    Things that you can do to boost your immune system and help your body get rid of HPV: get plenty of rest, eat a well-balanced diet of healthy foods, stop smoking, exercise regularly and decrease stress.    It is recommended that you have your next Pap smear and Human Papillomavirus (HPV) test in 1 year.    If you have additional questions regarding this result, please contact our Registered Nurse, , at [phone].      Sincerely,    Your [facility] Team

## 2021-06-23 NOTE — PROGRESS NOTES
"ASSESSMENT/PLAN:    Epicondylitis, lateral, right  Offered her an elbow brace  Discussed modified use of the elbow and arm  Ice, OTC analgesics prn  F/u if not better in 1 month    Irritant contact dermatitis, unspecified trigger  Advised skin hydration and prn hydrocortisone    SUBJECTIVE:    Sierra Danielle is a 58 y.o. female who came in today complaining of a rash near her eye and \"tennis elbow.\"   Patient lifted something too heavy or the wrong way with her right arm on 12/7/2018. She has had pain since then and her ROM and ability to lift things is being impacted. Complete extension of her elbow hurts, flexion hurts, and twisting her elbow hurts. She has broken a plate because her  is also being effected by the pain. Pt uses Ibuprofen sparingly for pain relief.  Patient has a rash near her eye on the right side that began 3 days ago. It started small but has spread since then. The rash is itchy/burning. She has used Hydrocortisone cream which is helping the rash improve.     Review of Systems (except those mentioned above)  Constitutional: Negative.   HENT: Negative.   Eyes: Negative.   Respiratory: Negative.   Cardiovascular: Negative.   Gastrointestinal: Negative.   Endocrine: Negative.   Genitourinary: Negative.   Musculoskeletal: Negative.   Skin: Negative.   Allergic/Immunologic: Negative.   Neurological: Negative.   Hematological: Negative.   Psychiatric/Behavioral: Negative.     Patient Active Problem List    Diagnosis Date Noted     Allergies      Obesity      No Known Allergies  No current outpatient medications on file.     No current facility-administered medications for this visit.      Past Medical History:   Diagnosis Date     Breast cancer (H) 2006     Heart murmur      Past Surgical History:   Procedure Laterality Date     BREAST BIOPSY Right 2005     BREAST LUMPECTOMY       CHOLECYSTECTOMY       RI EXCISE BREAST CYST      Description: Breast Surgery Lumpectomy;  Recorded: 05/01/2012;     " OR LAP,CHOLECYSTECTOMY      Description: Cholecystectomy Laparoscopic;  Recorded: 11/17/2008;     Social History     Socioeconomic History     Marital status:      Spouse name: None     Number of children: None     Years of education: None     Highest education level: None   Social Needs     Financial resource strain: None     Food insecurity - worry: None     Food insecurity - inability: None     Transportation needs - medical: None     Transportation needs - non-medical: None   Occupational History     None   Tobacco Use     Smoking status: Never Smoker     Smokeless tobacco: Never Used   Substance and Sexual Activity     Alcohol use: None     Drug use: None     Sexual activity: None   Other Topics Concern     None   Social History Narrative     None     Family History   Problem Relation Age of Onset     Breast cancer Paternal Aunt 60     Coronary artery disease Father      Pancreatic cancer Mother      OBJECTIVE:    Vitals:    01/17/19 1321   BP: 120/84   Pulse: 80       Physical Exam:  Constitutional: Patient was oriented to person, place, and time. Patient appeared well-developed and well-nourished. No distress.   Head: Normocephalic and atraumatic.   Right Ear: External ear normal.   Left Ear: External ear normal.   Nose: Nose normal.   Eyes: Conjunctivae and EOM were normal. Right eye exhibited no discharge. Left eye exhibited no discharge. No scleral icterus.   Skin: Skin was warm and dry. No rash noted. Patient was not diaphoretic. No erythema. No pallor. Erythematous slightly raised patch on the temporal right side of the face.   Extremities (Right Elbow): No edema, no ecchymosis, no deformity. Tenderness to palpation of the lateral epicondyl of the right elbow. There is ROM with pain.        ADDITIONAL HISTORY SUMMARIZED (2): None.  DECISION TO OBTAIN EXTRA INFORMATION (1): None.   RADIOLOGY TESTS (1): None.  LABS (1): None.  MEDICINE TESTS (1): None.  INDEPENDENT REVIEW (2 each): None.     Total  data points = 0    Total time was 8 minutes additional to the preventive, greater than 50% counseling and coordinating care regarding facial rash and tennis elbow.    By signing my name below, I, Falguni Dye, attest that this documentation has been prepared under the direction and in the presence of Dr. Chrissy Dukes.  Electronic Signature: Corby Hilliard. 01/17/2019 13:41.    I, Dr. Taryn Velarde MD , personally performed the services described in this documentation. All medical record entries made by the scribe were at my direction and in my presence. I have reviewed the chart and discharge instructions (if applicable) and agree that the record reflects my personal performance and is accurate and complete.

## 2021-06-29 NOTE — PROGRESS NOTES
Progress Notes by Taryn Cuellar MD at 9/16/2020  8:20 AM     Author: Taryn Cuellar MD Service: -- Author Type: Physician    Filed: 9/16/2020  4:58 PM Encounter Date: 9/16/2020 Status: Signed    : Taryn Cuellar MD (Physician)       FEMALE PREVENTATIVE EXAM    Assessment and Plan:     Patient has been advised of split billing requirements and indicates understanding: Yes    Sierra was seen today for annual exam.    Routine general medical examination at a health care facility  -     Gynecologic Cytology (PAP Smear)  We discussed healthy lifestyle, nutrition, cardiovascular risk reduction, self care, safety, sunscreen, seatbelt, and timing of cancer screening.  Health maintenance screening and immunizations reviewed with the patient.    BMI 36.0-36.9,adult  Counseled healthy lifestyle modifications     Elevated blood pressure reading without diagnosis of hypertension  Counseled healthy lifestyle modifications  F/u in 1month     Malignant neoplasm of right female breast (H) s/p lumpectomy and completed 5 years tamoxifen  Recommend yearly mammogram    Next follow up:  yearly    Immunization Review  Adult Imm Review: No immunizations due today    I discussed the following with the patient:   Adult Healthy Living: Importance of regular exercise  Healthy nutrition      Subjective:   Chief Complaint: Sierra Danielle is an 60 y.o. female here for a preventative health visit.    Patient has been advised of split billing requirements and indicates understanding: Yes  HPI:  Taking care of her 10 y.o and 18 month old grandchildren.  Her daughter (the kids's mother) is reported to not be active in the children's lives.  This is an added stress on her.    Healthy Habits  Are you taking a daily aspirin? No  Do you typically exercising at least 40 min, 3-4 times per week?  NO  Do you usually eat at least 4 servings of fruit and vegetables a day, include whole grains and fiber  "and avoid regularly eating high fat foods? NO  Have you had an eye exam in the past two years? Yes  Do you see a dentist twice per year? Yes  Do you have any concerns regarding sleep? No    Safety Screen  If you own firearms, are they secured in a locked gun cabinet or with trigger locks? The patient does not own any firearms  No data recorded    Review of Systems:  Please see above.  The rest of the review of systems are negative for all systems.     Pap History:   Yes - updated in Problem List and Health Maintenance accordingly  Cancer Screening       Status Date      MAMMOGRAM Overdue 1/31/2020      Done 1/31/2018 MAMMO SCREENING BILATERAL     Patient has more history with this topic...    PAP SMEAR Next Due 1/24/2023      Done 1/24/2018 GYNECOLOGIC CYTOLOGY (PAP SMEAR)          Patient Care Team:  Taryn Cuellar MD as PCP - General  Taryn Cuellar MD as Assigned PCP        History     Reviewed By Date/Time Sections Reviewed    Sherlyn Ramachandran CMA 9/16/2020  8:33 AM Tobacco            Objective:   Vital Signs:   Visit Vitals  /88 (Patient Site: Left Arm, Patient Position: Sitting, Cuff Size: Adult Regular)   Pulse 89   Ht 5' 1.5\" (1.562 m)   Wt 194 lb (88 kg)   SpO2 98%   BMI 36.06 kg/m           PHYSICAL EXAM    Objective:    Physical Exam   Vitals:    09/16/20 0922   BP: 150/88   Pulse:    SpO2:       Constitutional: Patient is oriented to person, place, and time. Patient appears well-developed and well-nourished. No distress.   Head: Normocephalic and atraumatic.   Right Ear: External ear normal. Normal TM  Left Ear: External ear normal. Normal TM  Nose: Nose normal.   Mouth/Throat: Oropharynx is clear and moist. No oropharyngeal exudate.   Eyes: Conjunctivae and EOM are normal. Pupils are equal, round, and reactive to light. Right eye exhibits no discharge. Left eye exhibits no discharge. No scleral icterus.   Neck: Neck supple. No JVD present. No tracheal deviation " present. No thyromegaly present.   Cardiovascular: Normal rate, regular rhythm, normal heart sounds and intact distal pulses. No murmur heard.   Pulmonary/Chest: Effort normal and breath sounds normal. No stridor. No respiratory distress. Patient has no wheezes, no rales, exhibits no tenderness.   Abdominal: Soft. Bowel sounds are normal. Patient exhibits no distension and no mass. There is no tenderness. There is no rebound and no guarding.   Lymphadenopathy:  Patient has no cervical adenopathy.   Neurological: Patient is alert and oriented to person, place, and time. Patient has normal reflexes. No cranial nerve deficit. Coordination normal.   Skin: Skin is warm and dry. No rash noted. Patient is not diaphoretic. No erythema. No pallor.   Normal breast exam  Normal pelvic exam    The 10-year ASCVD risk score ([place] CE Jr., et al., 2013) is: 4.4%    Values used to calculate the score:      Age: 60 years      Sex: Female      Is Non- : No      Diabetic: No      Tobacco smoker: No      Systolic Blood Pressure: 150 mmHg      Is BP treated: No      HDL Cholesterol: 63 mg/dL      Total Cholesterol: 218 mg/dL         Medication List      as of September 16, 2020  4:55 PM     You have not been prescribed any medications.         Additional Screenings Completed Today:

## 2021-07-13 ENCOUNTER — RECORDS - HEALTHEAST (OUTPATIENT)
Dept: ADMINISTRATIVE | Facility: CLINIC | Age: 61
End: 2021-07-13

## 2021-07-21 ENCOUNTER — RECORDS - HEALTHEAST (OUTPATIENT)
Dept: ADMINISTRATIVE | Facility: CLINIC | Age: 61
End: 2021-07-21

## 2021-09-02 ENCOUNTER — PATIENT OUTREACH (OUTPATIENT)
Dept: FAMILY MEDICINE | Facility: CLINIC | Age: 61
End: 2021-09-02

## 2021-09-02 NOTE — LETTER
September 2, 2021      Sierra Danielle  [address]  [facility] [address]        Dear ,    This letter is to remind you that you are due for your follow-up Pap smear and Human Papillomavirus (HPV) test.    Please call [phone] to schedule your appointment at your earliest convenience.    If you have completed the appointment outside of the [facility] system, please have the records forwarded to our office. We will update your chart for your provider to review before your next annual wellness visit.     Thank you for choosing [facility]!      Sincerely,    Your [facility] Care Team

## 2021-09-13 ENCOUNTER — MYC REFILL (OUTPATIENT)
Dept: FAMILY MEDICINE | Facility: CLINIC | Age: 61
End: 2021-09-13

## 2021-09-13 DIAGNOSIS — I10 ESSENTIAL HYPERTENSION: ICD-10-CM

## 2021-09-14 RX ORDER — LOSARTAN POTASSIUM 50 MG/1
50 TABLET ORAL DAILY
Qty: 30 TABLET | Refills: 0 | OUTPATIENT
Start: 2021-09-14

## 2021-09-14 NOTE — TELEPHONE ENCOUNTER
"Routing refill request to provider for review/approval because:  Labs out of range:  BP    Last Written Prescription Date:  8/11/21  Last Fill Quantity: 30,  # refills: 0   Last office visit provider:  2/10/21     Requested Prescriptions   Pending Prescriptions Disp Refills     losartan (COZAAR) 50 MG tablet 30 tablet 0     Sig: Take 1 tablet (50 mg) by mouth daily       Angiotensin-II Receptors Failed - 9/13/2021 12:46 PM        Failed - Last blood pressure under 140/90 in past 12 months     BP Readings from Last 3 Encounters:   02/10/21 (!) 154/94   01/12/21 (!) 152/102   10/15/20 (!) 140/108                 Passed - Recent (12 mo) or future (30 days) visit within the authorizing provider's specialty     Patient has had an office visit with the authorizing provider or a provider within the authorizing providers department within the previous 12 mos or has a future within next 30 days. See \"Patient Info\" tab in inbasket, or \"Choose Columns\" in Meds & Orders section of the refill encounter.              Passed - Medication is active on med list        Passed - Patient is age 18 or older        Passed - No active pregnancy on record        Passed - Normal serum creatinine on file in past 12 months     Recent Labs   Lab Test 10/15/20  0855   CR 0.73       Ok to refill medication if creatinine is low          Passed - Normal serum potassium on file in past 12 months     Recent Labs   Lab Test 10/15/20  0855   POTASSIUM 4.3                    Passed - No positive pregnancy test in past 12 months             Mike Morris RN 09/14/21 9:12 AM  "

## 2021-09-14 NOTE — TELEPHONE ENCOUNTER
Please assist the patient with a 15 minutes face to face appointment with me. Ok to double book today. Thank you.  Have an awesome day.

## 2021-09-15 RX ORDER — LISINOPRIL 10 MG/1
TABLET ORAL
COMMUNITY
Start: 2020-10-15 | End: 2021-09-16

## 2021-09-16 ENCOUNTER — OFFICE VISIT (OUTPATIENT)
Dept: FAMILY MEDICINE | Facility: CLINIC | Age: 61
End: 2021-09-16
Payer: COMMERCIAL

## 2021-09-16 VITALS
DIASTOLIC BLOOD PRESSURE: 70 MMHG | SYSTOLIC BLOOD PRESSURE: 118 MMHG | BODY MASS INDEX: 35.5 KG/M2 | WEIGHT: 191 LBS | OXYGEN SATURATION: 99 % | HEART RATE: 67 BPM

## 2021-09-16 DIAGNOSIS — I10 ESSENTIAL HYPERTENSION: ICD-10-CM

## 2021-09-16 PROCEDURE — 99213 OFFICE O/P EST LOW 20 MIN: CPT | Mod: 25 | Performed by: FAMILY MEDICINE

## 2021-09-16 PROCEDURE — 90682 RIV4 VACC RECOMBINANT DNA IM: CPT | Performed by: FAMILY MEDICINE

## 2021-09-16 PROCEDURE — 90471 IMMUNIZATION ADMIN: CPT | Performed by: FAMILY MEDICINE

## 2021-09-16 RX ORDER — LOSARTAN POTASSIUM 50 MG/1
50 TABLET ORAL DAILY
Qty: 90 TABLET | Refills: 3 | Status: SHIPPED | OUTPATIENT
Start: 2021-09-16 | End: 2021-12-02

## 2021-09-16 RX ORDER — HYDROCHLOROTHIAZIDE 25 MG/1
25 TABLET ORAL DAILY
Qty: 90 TABLET | Refills: 3 | Status: SHIPPED | OUTPATIENT
Start: 2021-09-16 | End: 2021-12-02

## 2021-09-16 NOTE — PROGRESS NOTES
ASSESSMENT/PLAN:  Sierra was seen today for blood pressure check.    Diagnoses and all orders for this visit:    Essential hypertension  -     hydrochlorothiazide (HYDRODIURIL) 25 MG tablet; Take 1 tablet (25 mg) by mouth daily  -     losartan (COZAAR) 50 MG tablet; Take 1 tablet (50 mg) by mouth daily  She is doing very well.  Blood pressures under great control.  Continue with healthy lifestyle modifications.  Follow-up yearly    SUBJECTIVE:    Sierra Danielle is a 61 year old female who came in today     Here for blood pressure check.  She has not complain of any further cough since we stopped lisinopril.  She is tolerating losartan.  She is also taking hydrochlorothiazide.  He has made some lifestyle changes.  She has lost 9.5 pounds.  She has bought a new house.  She has less stressors.  She is taking care of herself.  She is feeling great    Review of Systems (except those mentioned above)  Constitutional: Negative.   HENT: Negative.   Eyes: Negative.   Respiratory: Negative.   Cardiovascular: Negative.   Gastrointestinal: Negative.   Endocrine: Negative.   Genitourinary: Negative.   Musculoskeletal: Negative.   Skin: Negative.   Allergic/Immunologic: Negative.   Neurological: Negative.   Hematological: Negative.   Psychiatric/Behavioral: Negative.     Patient Active Problem List    Diagnosis Date Noted     Essential hypertension 02/10/2021     Priority: Medium     breast cancer right, s/p lumpectomy 09/16/2020     Priority: Medium     ASCUS with positive high risk HPV cervical 01/24/2018     Priority: Medium     1/24/18 ASCUS, +HR HPV (not 16/18)  9/16/20 NIL pap, +HR HPV (not 16/18). Plan: cotest in 1 year per provider  9/28/20 mychart sent  9/2/21 Reminder letter         Allergies      Priority: Medium     Created by Conversion         No Known Allergies  Current Outpatient Medications   Medication Sig Dispense Refill     hydrochlorothiazide (HYDRODIURIL) 25 MG tablet Take 1 tablet (25 mg) by mouth  daily 90 tablet 3     losartan (COZAAR) 50 MG tablet Take 1 tablet (50 mg) by mouth daily 90 tablet 3     Past Medical History:   Diagnosis Date     ASCUS with positive high risk HPV cervical 1/24/2018     Breast cancer (H) 2006     Heart murmur      Past Surgical History:   Procedure Laterality Date     BIOPSY BREAST Right 2005     C LAP,CHOLECYSTECTOMY/EXPLORE      Description: Cholecystectomy Laparoscopic;  Recorded: 11/17/2008;     CHOLECYSTECTOMY       EXCISE BREAST CYST/FIBROADENOMA/TUMOR/DUCT LESION/NIPPLE LESION/AREOLAR LESION      Description: Breast Surgery Lumpectomy;  Recorded: 05/01/2012;     LUMPECTOMY BREAST       Social History     Socioeconomic History     Marital status:      Spouse name: None     Number of children: None     Years of education: None     Highest education level: None   Occupational History     None   Tobacco Use     Smoking status: Never Smoker     Smokeless tobacco: Never Used   Substance and Sexual Activity     Alcohol use: None     Drug use: None     Sexual activity: None   Other Topics Concern     None   Social History Narrative     None     Social Determinants of Health     Financial Resource Strain:      Difficulty of Paying Living Expenses:    Food Insecurity:      Worried About Running Out of Food in the Last Year:      Ran Out of Food in the Last Year:    Transportation Needs:      Lack of Transportation (Medical):      Lack of Transportation (Non-Medical):    Physical Activity:      Days of Exercise per Week:      Minutes of Exercise per Session:    Stress:      Feeling of Stress :    Social Connections:      Frequency of Communication with Friends and Family:      Frequency of Social Gatherings with Friends and Family:      Attends Mu-ism Services:      Active Member of Clubs or Organizations:      Attends Club or Organization Meetings:      Marital Status:    Intimate Partner Violence:      Fear of Current or Ex-Partner:      Emotionally Abused:       Physically Abused:      Sexually Abused:      Family History   Problem Relation Age of Onset     Breast Cancer Paternal Aunt 60.00     Coronary Artery Disease Father      Pancreatic Cancer Mother          OBJECTIVE:    Vitals:    09/16/21 0820   BP: 118/70   BP Location: Left arm   Patient Position: Sitting   Cuff Size: Adult Large   Pulse: 67   SpO2: 99%   Weight: 86.6 kg (191 lb)     Body mass index is 35.5 kg/m .    Physical Exam:  Constitutional: Patient is oriented to person, place, and time. Patient appears well-developed and well-nourished. No distress.   Head: Normocephalic and atraumatic.   Right Ear: External ear normal.   Left Ear: External ear normal.   Eyes: Conjunctivae and EOM are normal. Right eye exhibits no discharge. Left eye exhibits no discharge. No scleral icterus.   Neurological: Patient is alert and oriented to person, place, and time.  Coordination normal.   Skin: No rash noted. Patient is not diaphoretic. No erythema. No pallor.

## 2021-09-20 ENCOUNTER — E-VISIT (OUTPATIENT)
Dept: FAMILY MEDICINE | Facility: CLINIC | Age: 61
End: 2021-09-20
Payer: COMMERCIAL

## 2021-09-20 DIAGNOSIS — Z00.00 ROUTINE ADULT HEALTH MAINTENANCE: Primary | ICD-10-CM

## 2021-09-20 PROCEDURE — 99207 PR NON-BILLABLE SERV PER CHARTING: CPT | Performed by: FAMILY MEDICINE

## 2021-09-20 NOTE — PATIENT INSTRUCTIONS
Thank you for choosing us for your care. I think an in-clinic visit would be best next steps based on your symptoms. Please schedule a clinic appointment; you won t be charged for this eVisit.      You can schedule an appointment right here in [facility], or call [phone]

## 2021-09-20 NOTE — TELEPHONE ENCOUNTER
Called pt to make appointment. Pt already has appt on Thursday, and the E visit was an accident.

## 2021-09-23 ENCOUNTER — OFFICE VISIT (OUTPATIENT)
Dept: FAMILY MEDICINE | Facility: CLINIC | Age: 61
End: 2021-09-23
Payer: COMMERCIAL

## 2021-09-23 VITALS
DIASTOLIC BLOOD PRESSURE: 84 MMHG | HEART RATE: 62 BPM | WEIGHT: 190.56 LBS | OXYGEN SATURATION: 97 % | SYSTOLIC BLOOD PRESSURE: 128 MMHG | HEIGHT: 62 IN | BODY MASS INDEX: 35.07 KG/M2

## 2021-09-23 DIAGNOSIS — Z00.00 ROUTINE ADULT HEALTH MAINTENANCE: Primary | ICD-10-CM

## 2021-09-23 LAB
ALBUMIN SERPL-MCNC: 3.9 G/DL (ref 3.5–5)
ALP SERPL-CCNC: 99 U/L (ref 45–120)
ALT SERPL W P-5'-P-CCNC: 29 U/L (ref 0–45)
ANION GAP SERPL CALCULATED.3IONS-SCNC: 15 MMOL/L (ref 5–18)
AST SERPL W P-5'-P-CCNC: 26 U/L (ref 0–40)
BILIRUB SERPL-MCNC: 0.5 MG/DL (ref 0–1)
BUN SERPL-MCNC: 14 MG/DL (ref 8–22)
CALCIUM SERPL-MCNC: 9.7 MG/DL (ref 8.5–10.5)
CHLORIDE BLD-SCNC: 102 MMOL/L (ref 98–107)
CHOLEST SERPL-MCNC: 220 MG/DL
CO2 SERPL-SCNC: 26 MMOL/L (ref 22–31)
CREAT SERPL-MCNC: 0.79 MG/DL (ref 0.6–1.1)
FASTING STATUS PATIENT QL REPORTED: ABNORMAL
GFR SERPL CREATININE-BSD FRML MDRD: 81 ML/MIN/1.73M2
GLUCOSE BLD-MCNC: 106 MG/DL (ref 70–125)
HDLC SERPL-MCNC: 56 MG/DL
LDLC SERPL CALC-MCNC: 140 MG/DL
POTASSIUM BLD-SCNC: 3.8 MMOL/L (ref 3.5–5)
PROT SERPL-MCNC: 7.3 G/DL (ref 6–8)
SODIUM SERPL-SCNC: 143 MMOL/L (ref 136–145)
TRIGL SERPL-MCNC: 120 MG/DL

## 2021-09-23 PROCEDURE — 87624 HPV HI-RISK TYP POOLED RSLT: CPT | Performed by: FAMILY MEDICINE

## 2021-09-23 PROCEDURE — G0123 SCREEN CERV/VAG THIN LAYER: HCPCS | Performed by: FAMILY MEDICINE

## 2021-09-23 PROCEDURE — 80053 COMPREHEN METABOLIC PANEL: CPT | Performed by: FAMILY MEDICINE

## 2021-09-23 PROCEDURE — 36415 COLL VENOUS BLD VENIPUNCTURE: CPT | Performed by: FAMILY MEDICINE

## 2021-09-23 PROCEDURE — 80061 LIPID PANEL: CPT | Performed by: FAMILY MEDICINE

## 2021-09-23 PROCEDURE — 99396 PREV VISIT EST AGE 40-64: CPT | Performed by: FAMILY MEDICINE

## 2021-09-23 ASSESSMENT — MIFFLIN-ST. JEOR: SCORE: 1382.64

## 2021-09-23 NOTE — PROGRESS NOTES
SUBJECTIVE:   CC: Sierra Danielle is an 61 year old woman who presents for preventive health visit.       Patient has been advised of split billing requirements and indicates understanding: Yes  Healthy Habits:     Getting at least 3 servings of Calcium per day:  Yes    Bi-annual eye exam:  Yes    Dental care twice a year:  Yes    Sleep apnea or symptoms of sleep apnea:  None    Diet:  Low salt and Low fat/cholesterol    Frequency of exercise:  None    Taking medications regularly:  No    Medication side effects:  None    PHQ-2 Total Score: 0    Additional concerns today:  No    Ability to successfully perform activities of daily living: Yes, no assistance needed  Home safety:  none identified     Today's PHQ-2 Score:   PHQ-2 ( 1999 Pfizer) 9/20/2021   Q1: Little interest or pleasure in doing things 0   Q2: Feeling down, depressed or hopeless 0   PHQ-2 Score 0   Q1: Little interest or pleasure in doing things Not at all   Q2: Feeling down, depressed or hopeless Not at all   PHQ-2 Score 0       Abuse: Current or Past (Physical, Sexual or Emotional) - No  Do you feel safe in your environment? Yes    Social History     Tobacco Use     Smoking status: Never Smoker     Smokeless tobacco: Never Used   Substance Use Topics     Alcohol use: Not on file     If you drink alcohol do you typically have >3 drinks per day or >7 drinks per week? No    Alcohol Use 9/20/2021   Prescreen: >3 drinks/day or >7 drinks/week? No   No flowsheet data found.    Reviewed orders with patient.  Reviewed health maintenance and updated orders accordingly - Yes  Lab work is in process    Breast Cancer Screening:  Any new diagnosis of family breast, ovarian, or bowel cancer? No    FHS-7: No flowsheet data found.  click delete button to remove this line now  Mammogram Screening: Recommended mammography every 1-2 years with patient discussion and risk factor consideration  Pertinent mammograms are reviewed under the imaging tab.    History of  abnormal Pap smear: NO - age 30-65 PAP every 5 years with negative HPV co-testing recommended  PAP / HPV Latest Ref Rng & Units 9/16/2020 1/24/2018   PAP Negative for squamous intraepithelial lesion or malignancy. Negative for squamous intraepithelial lesion or malignancy  Electronically signed by Alexus Aguilar CT (ASCP) on 9/25/2020 at 11:45 AM   Atypical squamous cells of undetermined significance  Electronically signed by Den Oliver MD on 1/30/2018 at  8:48 AM     HPV16 NEG Negative Negative   HPV18 NEG Negative Negative   HRHPV NEG Positive(A) Positive(A)     Reviewed and updated as needed this visit by clinical staff  Tobacco  Allergies  Meds              Reviewed and updated as needed this visit by Provider                Past Medical History:   Diagnosis Date     ASCUS with positive high risk HPV cervical 1/24/2018     Breast cancer (H) 2006     Heart murmur       Past Surgical History:   Procedure Laterality Date     BIOPSY BREAST Right 2005     C LAP,CHOLECYSTECTOMY/EXPLORE      Description: Cholecystectomy Laparoscopic;  Recorded: 11/17/2008;     CHOLECYSTECTOMY       EXCISE BREAST CYST/FIBROADENOMA/TUMOR/DUCT LESION/NIPPLE LESION/AREOLAR LESION      Description: Breast Surgery Lumpectomy;  Recorded: 05/01/2012;     LUMPECTOMY BREAST         Review of Systems  CONSTITUTIONAL: NEGATIVE for fever, chills, change in weight  INTEGUMENTARY/SKIN: NEGATIVE for worrisome rashes, moles or lesions  EYES: NEGATIVE for vision changes or irritation  ENT: NEGATIVE for ear, mouth and throat problems  RESP: NEGATIVE for significant cough or SOB  BREAST: NEGATIVE for masses, tenderness or discharge  CV: NEGATIVE for chest pain, palpitations or peripheral edema  GI: NEGATIVE for nausea, abdominal pain, heartburn, or change in bowel habits  : NEGATIVE for unusual urinary or vaginal symptoms. No vaginal bleeding.  MUSCULOSKELETAL: NEGATIVE for significant arthralgias or myalgia  NEURO: NEGATIVE for  "weakness, dizziness or paresthesias  PSYCHIATRIC: NEGATIVE for changes in mood or affect      OBJECTIVE:   /84 (BP Location: Left arm, Patient Position: Sitting, Cuff Size: Adult Regular)   Pulse 62   Ht 1.575 m (5' 2\")   Wt 86.4 kg (190 lb 9 oz)   SpO2 97%   BMI 34.85 kg/m    Physical Exam  GENERAL APPEARANCE: healthy, alert and no distress  EYES: Eyes grossly normal to inspection, PERRL and conjunctivae and sclerae normal  HENT: ear canals and TM's normal, nose and mouth without ulcers or lesions, oropharynx clear and oral mucous membranes moist  NECK: no adenopathy, no asymmetry, masses, or scars and thyroid normal to palpation  RESP: lungs clear to auscultation - no rales, rhonchi or wheezes  BREAST: normal without masses, tenderness or nipple discharge and no palpable axillary masses or adenopathy  CV: regular rate and rhythm, normal S1 S2, no S3 or S4, no murmur, click or rub, no peripheral edema and peripheral pulses strong  ABDOMEN: soft, nontender, no hepatosplenomegaly, no masses and bowel sounds normal  MS: no musculoskeletal defects are noted and gait is age appropriate without ataxia  SKIN: no suspicious lesions or rashes  NEURO: Normal strength and tone, sensory exam grossly normal, mentation intact and speech normal  PSYCH: mentation appears normal and affect normal/bright  Pelvic exam: normal vagina and vulva, normal cervix without lesions or tenderness, uterus normal size anteverted, adenxa normal in size without tenderness, pap smear done      ASSESSMENT/PLAN:   Sierra was seen today for physical.    Diagnoses and all orders for this visit:    Routine adult health maintenance  -     Lipid panel reflex to direct LDL Fasting; Future  -     Comprehensive metabolic panel (BMP + Alb, Alk Phos, ALT, AST, Total. Bili, TP); Future  -     Pap screen with HPV - recommended age 30 - 65 years    HTN, BMI=34  Stable BP on meds. Will continue to work on lifestyle modifications    Patient has been " "advised of split billing requirements and indicates understanding: Yes  COUNSELING:  Reviewed preventive health counseling, as reflected in patient instructions    Estimated body mass index is 34.85 kg/m  as calculated from the following:    Height as of this encounter: 1.575 m (5' 2\").    Weight as of this encounter: 86.4 kg (190 lb 9 oz).    Weight management plan: Discussed healthy diet and exercise guidelines    She reports that she has never smoked. She has never used smokeless tobacco.      Counseling Resources:  ATP IV Guidelines  Pooled Cohorts Equation Calculator  Breast Cancer Risk Calculator  BRCA-Related Cancer Risk Assessment: FHS-7 Tool  FRAX Risk Assessment  ICSI Preventive Guidelines  Dietary Guidelines for Americans, 2010  USDA's MyPlate  ASA Prophylaxis  Lung CA Screening    Taryn Velarde MD  [facility]  "

## 2021-09-27 LAB
HUMAN PAPILLOMA VIRUS 16 DNA: NEGATIVE
HUMAN PAPILLOMA VIRUS 18 DNA: NEGATIVE
HUMAN PAPILLOMA VIRUS FINAL DIAGNOSIS: ABNORMAL
HUMAN PAPILLOMA VIRUS OTHER HR: POSITIVE

## 2021-09-28 LAB
BKR LAB AP GYN ADEQUACY: NORMAL
BKR LAB AP GYN INTERPRETATION: NORMAL
BKR LAB AP GYN OTHER FINDINGS: NORMAL
BKR LAB AP HPV REFLEX: NORMAL
BKR LAB AP PREVIOUS ABNL DX: NORMAL
BKR LAB AP PREVIOUS ABNORMAL: NORMAL
PATH REPORT.COMMENTS IMP SPEC: NORMAL
PATH REPORT.RELEVANT HX SPEC: NORMAL

## 2021-09-29 ENCOUNTER — PATIENT OUTREACH (OUTPATIENT)
Dept: FAMILY MEDICINE | Facility: CLINIC | Age: 61
End: 2021-09-29

## 2021-10-12 ENCOUNTER — OFFICE VISIT (OUTPATIENT)
Dept: FAMILY MEDICINE | Facility: CLINIC | Age: 61
End: 2021-10-12
Payer: COMMERCIAL

## 2021-10-12 VITALS
OXYGEN SATURATION: 97 % | DIASTOLIC BLOOD PRESSURE: 72 MMHG | HEART RATE: 75 BPM | SYSTOLIC BLOOD PRESSURE: 130 MMHG | BODY MASS INDEX: 34.09 KG/M2 | WEIGHT: 186.4 LBS

## 2021-10-12 DIAGNOSIS — R87.610 ASCUS WITH POSITIVE HIGH RISK HPV CERVICAL: Primary | ICD-10-CM

## 2021-10-12 DIAGNOSIS — R87.810 ASCUS WITH POSITIVE HIGH RISK HPV CERVICAL: Primary | ICD-10-CM

## 2021-10-12 PROCEDURE — 99207 PR NO CHARGE LOS: CPT | Performed by: FAMILY MEDICINE

## 2021-10-12 PROCEDURE — 57454 BX/CURETT OF CERVIX W/SCOPE: CPT | Performed by: FAMILY MEDICINE

## 2021-10-12 PROCEDURE — 88305 TISSUE EXAM BY PATHOLOGIST: CPT | Performed by: PATHOLOGY

## 2021-10-12 NOTE — PROGRESS NOTES
Sierra Danielle is a .61 year old female who presents for initial colposcopy, referred by Taryn Cuellar  Pap smear 1 months ago showed: ASC-US with HR HPV . The prior pap showed abnormal 2020 and 2021 , had one abnormal pap prior to that 26 yr ago while she was pregnant no colp during that time     Previous history of abnormal paps?: Yes ? Most likely ASCUS  History of cryotherapy (freezing)?: : No  History of veneral diseases: : No  Do you desire testing for any of these diseases? : No  History of genital warts:  No  Visible warts now?:  No  Previously treated? If so, how?:  No     No LMP recorded. Patient is postmenopausal.    Type of contraception: post menopausal status  Age at first sexual intercourse: late teens  Number of sexual partners (lifetime): 2-3  History   Smoking Status     Never Smoker   Smokeless Tobacco     Never Used       History of sexual abuse:  No    No Known Allergies    PROCEDURE:  Before the procedure, it was ensured that the patient was educated regarding the nature of her findings to date, the implications of them, and what was to be done. She has been made aware of the role of HPV, the natural history of infection, ways to minimize her future risk, the effect of HPV on the cervix, and treatment options available should they be indicated. The   pathophysiology of the cervix, including a discussion of squamous vs. endometrial cells, and squamous metaplasia have all been reviewed, using illustrations and sketches. The details of the colposcopic procedure were reviewed, as well as the risks of missed diagnoses, pain, infection and bleeding. All questions were answered before proceeding, and informed consent was therefore obtained.    Bimanual examination: was performed and was unremarkable.    The following examination was done with the colposcope:    Unenhanced examination of the cervix was normal without lesions.      Pap repeated?:  No  SCJ seen?:  no  Endocervical  speculum needed?:  No very stenosed canal very difficult and painful ECC   ECC done?:  Yes   Lugol's solution used?:  Yes  No abnormal finding   Satisfactory examination?:  no    Vaginal vault: normal to cursory inspection yes  Urethra normal?:  yes  Labia normal?:  yes  Perineum normal?:  yes  Rectum normal?:  yes    FINDINGS:  Please see image   Cervix: HPV effect at 12:00  Procedure: biopsies taken (not including ECC): 1.    Procedure summary: Patient tolerated procedure well     Assessment: HPV related changes      Plan: Specimens labelled and sent to pathology., Will base further treatment on pathology findings., treatment options discussed with patient, post biopsy instructions given to patient, and call to discuss Pathology results    Ester Gloria MD 10/12/2021 7:07 AM   [facility].  [phone]

## 2021-10-13 LAB
PATH REPORT.COMMENTS IMP SPEC: NORMAL
PATH REPORT.FINAL DX SPEC: NORMAL
PATH REPORT.GROSS SPEC: NORMAL
PATH REPORT.MICROSCOPIC SPEC OTHER STN: NORMAL
PATH REPORT.RELEVANT HX SPEC: NORMAL
PHOTO IMAGE: NORMAL

## 2021-11-01 ENCOUNTER — HOSPITAL ENCOUNTER (EMERGENCY)
Facility: CLINIC | Age: 61
Discharge: HOME | End: 2021-11-02
Attending: EMERGENCY MEDICINE | Admitting: EMERGENCY MEDICINE
Payer: COMMERCIAL

## 2021-11-01 ENCOUNTER — NURSE TRIAGE (OUTPATIENT)
Dept: NURSING | Facility: CLINIC | Age: 61
End: 2021-11-01

## 2021-11-01 ENCOUNTER — APPOINTMENT (OUTPATIENT)
Dept: RADIOLOGY | Facility: CLINIC | Age: 61
End: 2021-11-01
Attending: EMERGENCY MEDICINE
Payer: COMMERCIAL

## 2021-11-01 DIAGNOSIS — E87.6 HYPOKALEMIA: ICD-10-CM

## 2021-11-01 DIAGNOSIS — R07.89 ATYPICAL CHEST PAIN: ICD-10-CM

## 2021-11-01 DIAGNOSIS — M54.9 UPPER BACK PAIN: ICD-10-CM

## 2021-11-01 LAB
ANION GAP SERPL CALCULATED.3IONS-SCNC: 10 MMOL/L (ref 5–18)
BASOPHILS # BLD AUTO: 0 10E3/UL (ref 0–0.2)
BASOPHILS NFR BLD AUTO: 0 %
BUN SERPL-MCNC: 16 MG/DL (ref 8–22)
CALCIUM SERPL-MCNC: 9.5 MG/DL (ref 8.5–10.5)
CHLORIDE BLD-SCNC: 102 MMOL/L (ref 98–107)
CO2 SERPL-SCNC: 30 MMOL/L (ref 22–31)
CREAT SERPL-MCNC: 0.83 MG/DL (ref 0.6–1.1)
D DIMER PPP FEU-MCNC: 0.34 UG/ML FEU (ref 0–0.5)
EOSINOPHIL # BLD AUTO: 0.2 10E3/UL (ref 0–0.7)
EOSINOPHIL NFR BLD AUTO: 3 %
ERYTHROCYTE [DISTWIDTH] IN BLOOD BY AUTOMATED COUNT: 13.2 % (ref 10–15)
GFR SERPL CREATININE-BSD FRML MDRD: 76 ML/MIN/1.73M2
GLUCOSE BLD-MCNC: 129 MG/DL (ref 70–125)
HCT VFR BLD AUTO: 43 % (ref 35–47)
HGB BLD-MCNC: 14 G/DL (ref 11.7–15.7)
HOLD SPECIMEN: NORMAL
IMM GRANULOCYTES # BLD: 0 10E3/UL
IMM GRANULOCYTES NFR BLD: 0 %
LYMPHOCYTES # BLD AUTO: 2.9 10E3/UL (ref 0.8–5.3)
LYMPHOCYTES NFR BLD AUTO: 37 %
MCH RBC QN AUTO: 28.7 PG (ref 26.5–33)
MCHC RBC AUTO-ENTMCNC: 32.6 G/DL (ref 31.5–36.5)
MCV RBC AUTO: 88 FL (ref 78–100)
MONOCYTES # BLD AUTO: 0.6 10E3/UL (ref 0–1.3)
MONOCYTES NFR BLD AUTO: 7 %
NEUTROPHILS # BLD AUTO: 4.1 10E3/UL (ref 1.6–8.3)
NEUTROPHILS NFR BLD AUTO: 53 %
NRBC # BLD AUTO: 0 10E3/UL
NRBC BLD AUTO-RTO: 0 /100
PLATELET # BLD AUTO: 299 10E3/UL (ref 150–450)
POTASSIUM BLD-SCNC: 2.8 MMOL/L (ref 3.5–5)
RBC # BLD AUTO: 4.88 10E6/UL (ref 3.8–5.2)
SODIUM SERPL-SCNC: 142 MMOL/L (ref 136–145)
TROPONIN I SERPL-MCNC: <0.01 NG/ML (ref 0–0.29)
WBC # BLD AUTO: 7.9 10E3/UL (ref 4–11)

## 2021-11-01 PROCEDURE — 250N000011 HC RX IP 250 OP 636: Performed by: EMERGENCY MEDICINE

## 2021-11-01 PROCEDURE — 250N000013 HC RX MED GY IP 250 OP 250 PS 637: Performed by: EMERGENCY MEDICINE

## 2021-11-01 PROCEDURE — 99285 EMERGENCY DEPT VISIT HI MDM: CPT | Mod: 25

## 2021-11-01 PROCEDURE — 96365 THER/PROPH/DIAG IV INF INIT: CPT

## 2021-11-01 PROCEDURE — 85004 AUTOMATED DIFF WBC COUNT: CPT | Performed by: EMERGENCY MEDICINE

## 2021-11-01 PROCEDURE — 93005 ELECTROCARDIOGRAM TRACING: CPT | Performed by: EMERGENCY MEDICINE

## 2021-11-01 PROCEDURE — 36415 COLL VENOUS BLD VENIPUNCTURE: CPT | Performed by: EMERGENCY MEDICINE

## 2021-11-01 PROCEDURE — 71045 X-RAY EXAM CHEST 1 VIEW: CPT

## 2021-11-01 PROCEDURE — 84132 ASSAY OF SERUM POTASSIUM: CPT | Performed by: EMERGENCY MEDICINE

## 2021-11-01 PROCEDURE — 85379 FIBRIN DEGRADATION QUANT: CPT | Performed by: EMERGENCY MEDICINE

## 2021-11-01 PROCEDURE — 96375 TX/PRO/DX INJ NEW DRUG ADDON: CPT

## 2021-11-01 PROCEDURE — 82565 ASSAY OF CREATININE: CPT | Performed by: EMERGENCY MEDICINE

## 2021-11-01 PROCEDURE — 96366 THER/PROPH/DIAG IV INF ADDON: CPT

## 2021-11-01 PROCEDURE — 84484 ASSAY OF TROPONIN QUANT: CPT | Performed by: EMERGENCY MEDICINE

## 2021-11-01 RX ORDER — KETOROLAC TROMETHAMINE 15 MG/ML
15 INJECTION, SOLUTION INTRAMUSCULAR; INTRAVENOUS ONCE
Status: COMPLETED | OUTPATIENT
Start: 2021-11-01 | End: 2021-11-01

## 2021-11-01 RX ORDER — POTASSIUM CHLORIDE 1.5 G/1.58G
20 POWDER, FOR SOLUTION ORAL ONCE
Status: COMPLETED | OUTPATIENT
Start: 2021-11-01 | End: 2021-11-01

## 2021-11-01 RX ORDER — POTASSIUM CHLORIDE 7.45 MG/ML
10 INJECTION INTRAVENOUS ONCE
Status: COMPLETED | OUTPATIENT
Start: 2021-11-01 | End: 2021-11-01

## 2021-11-01 RX ADMIN — KETOROLAC TROMETHAMINE 15 MG: 15 INJECTION, SOLUTION INTRAMUSCULAR; INTRAVENOUS at 21:30

## 2021-11-01 RX ADMIN — POTASSIUM CHLORIDE 20 MEQ: 1.5 POWDER, FOR SOLUTION ORAL at 23:20

## 2021-11-01 RX ADMIN — POTASSIUM CHLORIDE 10 MEQ: 7.46 INJECTION, SOLUTION INTRAVENOUS at 21:30

## 2021-11-01 ASSESSMENT — MIFFLIN-ST. JEOR: SCORE: 1337

## 2021-11-01 NOTE — TELEPHONE ENCOUNTER
Triage Call: Patient is having severe back and Left side pain. Behind shoulder blades. When she takes a deep breath it is severe, but otherwise it is a constant dull pain. Started this morning. Per protocol guidelines patient ws advised to call 911. Patient stated understanding and will call 911.     COVID 19 Nurse Triage Plan/Patient Instructions    Please be aware that novel coronavirus (COVID-19) may be circulating in the community. If you develop symptoms such as fever, cough, or SOB or if you have concerns about the presence of another infection including coronavirus (COVID-19), please contact your health care provider or visit [email or web address].     Disposition/Instructions    Call to EMS/911 recommended. Follow protocol based instructions.     Bring Your Own Device:  Please also bring your smart device(s) (smart phones, tablets, laptops) and their charging cables for your personal use and to communicate with your care team during your visit.    Thank you for taking steps to prevent the spread of this virus.  o Limit your contact with others.  o Wear a simple mask to cover your cough.  o Wash your hands well and often.    Resources    [facility]: About COVID-19: [email or web address]    CDC: What to Do If You're Sick: www.cdc.gov/coronavirus/2019-ncov/about/steps-when-sick.html    CDC: Ending Home Isolation: www.cdc.gov/coronavirus/2019-ncov/hcp/disposition-in-home-patients.html     CDC: Caring for Someone: www.cdc.gov/coronavirus/2019-ncov/if-you-are-sick/care-for-someone.html     [facility]: Interim Guidance for Hospital Discharge to Home: www.health.[email or web address].[place]/diseases/coronavirus/hcp/hospdischarge.pdf    [facility] clinical trials (COVID-19 research studies): clinicalaffairs.[email or web address]/umn-clinical-trials     Below are the COVID-19 hotlines at the [place] Department of Health ([facility]). Interpreters are available.   o For health questions: Call 797-232-7283 or 1-133.733.1177  (7 a.m. to 7 p.m.)  o For questions about schools and childcare: Call 304-360-8276 or 1-284.988.8102 (7 a.m. to 7 p.m.)     Estefany Gonzalez RN Nursing Advisor 11/1/2021 5:31 PM     Reason for Disposition    [1] Chest pain lasts > 5 minutes AND [2] age > 50    Additional Information    Negative: Severe difficulty breathing (e.g., struggling for each breath, speaks in single words)    Negative: Difficult to awaken or acting confused (e.g., disoriented, slurred speech)    Negative: Shock suspected (e.g., cold/pale/clammy skin, too weak to stand, low BP, rapid pulse)    Negative: [1] Chest pain lasts > 5 minutes AND [2] history of heart disease  (i.e., heart attack, bypass surgery, angina, angioplasty, CHF; not just a heart murmur)    Negative: [1] Chest pain lasts > 5 minutes AND [2] described as crushing, pressure-like, or heavy    Protocols used: CHEST PAIN-A-AH

## 2021-11-02 VITALS
TEMPERATURE: 97.4 F | RESPIRATION RATE: 20 BRPM | HEIGHT: 61 IN | DIASTOLIC BLOOD PRESSURE: 62 MMHG | OXYGEN SATURATION: 96 % | HEART RATE: 75 BPM | SYSTOLIC BLOOD PRESSURE: 125 MMHG | WEIGHT: 184 LBS | BODY MASS INDEX: 34.74 KG/M2

## 2021-11-02 LAB
POTASSIUM BLD-SCNC: 3.3 MMOL/L (ref 3.5–5)
TROPONIN I SERPL-MCNC: <0.01 NG/ML (ref 0–0.29)

## 2021-11-02 RX ORDER — METHOCARBAMOL 500 MG/1
500 TABLET, FILM COATED ORAL 4 TIMES DAILY PRN
Qty: 30 TABLET | Refills: 0 | Status: SHIPPED | OUTPATIENT
Start: 2021-11-02 | End: 2022-02-15

## 2021-11-02 NOTE — ED PROVIDER NOTES
EMERGENCY DEPARTMENT ENCOUNTER      NAME: Sierra Danielle  AGE: 61 year old female  YOB: 1960  MRN: [id number]  EVALUATION DATE & TIME: 11/1/2021  7:40 PM    PCP: Taryn Cuellar    ED PROVIDER: Haroon Cardenas D.O.      Chief Complaint   Patient presents with     Chest Pain     Back Pain     HPI    Patient information was obtained from: patient     Use of : N/A    Sierra Danielle is a 61 year old female with PMH of breast cancer, HTN and heart murmer who presents with shoulder blade pain radiating into her chest. Patient reports that last night she began having sharp pain behind her shoulder blades medially (~24  hours ago). This pain continued into this morning and has worsened throughout the day. The pain is exacerbated with deep breaths causing it to radiate into her chest underneath her breast. She took Tylenol this morning with little relief to her symptoms. She denies arm pain, neck pain, nausea, vomiting, lightheadedness, cough, fever, congestion, dysuria, hematuria, diarrhea, or any additional symptoms at this time. She drinks alcohol occasionally and denies tobacco use.       I, Marcy Dye, am serving as a scribe to document services personally performed by Haroon Cardenas MD, based on my observations and the provider's statements to me.  I, Haroon Cardenas MD, attest that Marcy Dye is acting in a scribe capacity, has observed my performance of the services and has documented them in accordance with my direction.     REVIEW OF SYSTEMS  Constitutional:  Denies fever, chills, weight loss, lightheadedness or weakness  Eyes:  No pain, discharge, redness  HENT:  Denies sore throat, ear pain, congestion  Respiratory: No SOB, wheeze or cough  Cardiovascular:  No CP, palpitations  GI:  Denies abdominal pain, nausea, vomiting, diarrhea  : Denies dysuria, hematuria  Musculoskeletal:  Positive for shoulder blade pain radiating into her chest. Denies neck or arm pain, any new  muscle/joint swelling or loss of function.  Skin:  Denies rash, pallor  Neurologic:  Denies headache, focal weakness or sensory changes  Lymph: Denies swollen nodes    All other systems negative unless noted in HPI.    PAST MEDICAL HISTORY:  Past Medical History:   Diagnosis Date     ASCUS with positive high risk HPV cervical 1/24/2018     Breast cancer (H) 2006     Heart murmur      PAST SURGICAL HISTORY:  Past Surgical History:   Procedure Laterality Date     BIOPSY BREAST Right 2005     C LAP,CHOLECYSTECTOMY/EXPLORE      Description: Cholecystectomy Laparoscopic;  Recorded: 11/17/2008;     CHOLECYSTECTOMY       EXCISE BREAST CYST/FIBROADENOMA/TUMOR/DUCT LESION/NIPPLE LESION/AREOLAR LESION      Description: Breast Surgery Lumpectomy;  Recorded: 05/01/2012;     LUMPECTOMY BREAST       CURRENT MEDICATIONS:    No current facility-administered medications for this encounter.     Current Outpatient Medications   Medication     methocarbamol (ROBAXIN) 500 MG tablet     hydrochlorothiazide (HYDRODIURIL) 25 MG tablet     losartan (COZAAR) 50 MG tablet     ALLERGIES:  No Known Allergies    FAMILY HISTORY:  Family History   Problem Relation Age of Onset     Breast Cancer Paternal Aunt 60.00     Coronary Artery Disease Father      Pancreatic Cancer Mother      SOCIAL HISTORY:  Social History     Socioeconomic History     Marital status:      Spouse name: Not on file     Number of children: Not on file     Years of education: Not on file     Highest education level: Not on file   Occupational History     Not on file   Tobacco Use     Smoking status: Never Smoker     Smokeless tobacco: Never Used   Substance and Sexual Activity     Alcohol use: Not on file     Drug use: Not on file     Sexual activity: Not on file   Other Topics Concern     Not on file   Social History Narrative     Not on file     Social Determinants of Health     Financial Resource Strain:      Difficulty of Paying Living Expenses:    Food Insecurity:  "     Worried About Running Out of Food in the Last Year:      Ran Out of Food in the Last Year:    Transportation Needs:      Lack of Transportation (Medical):      Lack of Transportation (Non-Medical):    Physical Activity:      Days of Exercise per Week:      Minutes of Exercise per Session:    Stress:      Feeling of Stress :    Social Connections:      Frequency of Communication with Friends and Family:      Frequency of Social Gatherings with Friends and Family:      Attends Sabianist Services:      Active Member of Clubs or Organizations:      Attends Club or Organization Meetings:      Marital Status:    Intimate Partner Violence:      Fear of Current or Ex-Partner:      Emotionally Abused:      Physically Abused:      Sexually Abused:      VITALS:  Patient Vitals for the past 24 hrs:   BP Temp Temp src Pulse Resp SpO2 Height Weight   11/01/21 2200 (!) 180/91 -- -- 74 16 98 % -- --   11/01/21 2130 (!) 160/72 -- -- 64 18 99 % -- --   11/01/21 2100 131/63 -- -- 62 21 98 % -- --   11/01/21 2030 (!) 152/65 -- -- 65 18 98 % -- --   11/01/21 2000 (!) 155/66 -- -- 64 20 99 % -- --   11/01/21 1955 (!) 154/77 -- -- 65 16 100 % -- --   11/01/21 1914 (!) 150/74 97.4  F (36.3  C) Oral 68 16 100 % 1.549 m (5' 1\") 83.5 kg (184 lb)     PHYSICAL EXAM    VITAL SIGNS: BP (!) 180/91   Pulse 74   Temp 97.4  F (36.3  C) (Oral)   Resp 16   Ht 1.549 m (5' 1\")   Wt 83.5 kg (184 lb)   SpO2 98%   BMI 34.77 kg/m      General Appearance: Well-appearing, well-nourished, no acute distress   Head:  Normocephalic, without obvious abnormality, atraumatic  Eyes:  PERRL, conjunctiva/corneas clear, EOM's intact,  ENT:  Lips, mucosa, and tongue normal, membranes are moist without pallor  Neck:  Normal ROM, symmetrical, trachea midline    Chest:  No tenderness or deformity, no crepitus  Cardio:  Regular rate and rhythm, no murmur, rub or gallop, 2+ pulses symmetric in all extremities  Pulm:  Clear to auscultation bilaterally, respirations " unlabored,  Back:  Left mid thoracic paraspinal tenderness. ROM normal, no CVA tenderness, no midline tenderness,  Abdomen:  Soft, non-tender, no rebound or guarding.  Musculoskeletal: Full ROM, no edema, no cyanosis, good ROM of major joints  Integument:  Warm, Dry, No erythema, No rash.    Neurologic:  Alert & oriented.  No focal deficits appreciated.  Ambulatory.  Psychiatric:  Affect normal, Judgment normal, Mood normal.      LABS  Results for orders placed or performed during the hospital encounter of 11/01/21 (from the past 24 hour(s))   [place] Draw    Narrative    The following orders were created for panel order [place] Draw.  Procedure                               Abnormality         Status                     ---------                               -----------         ------                     Extra Blue Top Tube[[id number]]                              Final result               Extra Red Top Tube[[id number]]                               Final result               Extra Green Top (Lithium...[[id number]]                      Final result               Extra Purple Top Tube[[id number]]                            Final result                 Please view results for these tests on the individual orders.   Extra Blue Top Tube   Result Value Ref Range    Hold Specimen JIC    Extra Red Top Tube   Result Value Ref Range    Hold Specimen JIC    Extra Green Top (Lithium Heparin) Tube   Result Value Ref Range    Hold Specimen JIC    Extra Purple Top Tube   Result Value Ref Range    Hold Specimen JIC    CBC with platelets + differential    Narrative    The following orders were created for panel order CBC with platelets + differential.  Procedure                               Abnormality         Status                     ---------                               -----------         ------                     CBC with platelets and d...[[id number]]                      Final result                 Please view results  for these tests on the individual orders.   Basic metabolic panel   Result Value Ref Range    Sodium 142 136 - 145 mmol/L    Potassium 2.8 (LL) 3.5 - 5.0 mmol/L    Chloride 102 98 - 107 mmol/L    Carbon Dioxide (CO2) 30 22 - 31 mmol/L    Anion Gap 10 5 - 18 mmol/L    Urea Nitrogen 16 8 - 22 mg/dL    Creatinine 0.83 0.60 - 1.10 mg/dL    Calcium 9.5 8.5 - 10.5 mg/dL    Glucose 129 (H) 70 - 125 mg/dL    GFR Estimate 76 >60 mL/min/1.73m2   Troponin I (now)   Result Value Ref Range    Troponin I <0.01 0.00 - 0.29 ng/mL   D dimer quantitative   Result Value Ref Range    D-Dimer Quantitative 0.34 0.00 - 0.50 ug/mL FEU    Narrative    This D-dimer assay is intended for use in conjunction with a clinical pretest probability assessment model to exclude pulmonary embolism (PE) and deep venous thrombosis (DVT) in outpatients suspected of PE or DVT. The cut-off value is 0.50 ug/mL FEU.   CBC with platelets and differential   Result Value Ref Range    WBC Count 7.9 4.0 - 11.0 10e3/uL    RBC Count 4.88 3.80 - 5.20 10e6/uL    Hemoglobin 14.0 11.7 - 15.7 g/dL    Hematocrit 43.0 35.0 - 47.0 %    MCV 88 78 - 100 fL    MCH 28.7 26.5 - 33.0 pg    MCHC 32.6 31.5 - 36.5 g/dL    RDW 13.2 10.0 - 15.0 %    Platelet Count 299 150 - 450 10e3/uL    % Neutrophils 53 %    % Lymphocytes 37 %    % Monocytes 7 %    % Eosinophils 3 %    % Basophils 0 %    % Immature Granulocytes 0 %    NRBCs per 100 WBC 0 <1 /100    Absolute Neutrophils 4.1 1.6 - 8.3 10e3/uL    Absolute Lymphocytes 2.9 0.8 - 5.3 10e3/uL    Absolute Monocytes 0.6 0.0 - 1.3 10e3/uL    Absolute Eosinophils 0.2 0.0 - 0.7 10e3/uL    Absolute Basophils 0.0 0.0 - 0.2 10e3/uL    Absolute Immature Granulocytes 0.0 <=0.0 10e3/uL    Absolute NRBCs 0.0 10e3/uL   XR Chest Port 1 View    Narrative    EXAM: XR CHEST PORT 1 VIEW  LOCATION: [facility]  DATE/TIME: 11/1/2021 9:44 PM    INDICATION: Chest pain  COMPARISON: None.      Impression    IMPRESSION: Negative chest.    Potassium   Result Value Ref Range    Potassium 3.3 (L) 3.5 - 5.0 mmol/L       RADIOLOGY  XR Chest Port 1 View   Final Result   IMPRESSION: Negative chest.           EKG:    Rate: 68 bpm  Rhythm: Normal Sinus Rhythm  Axis: Normal  Interval: Normal  Conduction: Normal  QRS: Normal  ST: Normal  T-wave: Inverted leads V2, V3, V4  QT: Not prolonged  Comparison EKG: No significant change compared to 2016  Impression:  No acute ischemic change   I have independently reviewed and interpreted today's EKG, pending Cardiologist read        FINAL IMPRESSION:  1. Upper back pain    2. Atypical chest pain    3. Hypokalemia          ED COURSE & MEDICAL DECISION MAKIN:53 PM I met with the patient to gather history and to perform my initial exam. I discussed the plan for care while in the Emergency Department. PPE worn throughout all patient encounters; N95 mask, gloves.  9:00 PM I rechecked patient and updated her on results.  10:11 PM  I rechecked the patient.  12:10 AM I rechecked the patient. Her pain is better after receiving the Toradol. We discussed the plan for discharge and the patient is agreeable. Reviewed supportive cares, symptomatic treatment, outpatient follow up, and reasons to return to the Emergency Department. Patient to be discharged by ED RN.       Pertinent Labs & Imaging studies reviewed. (See chart for details)  61 year old female presents to the Emergency Department for evaluation of upper back pain on the left by the shoulder blade that radiates around to the left side of the chest.  The symptoms did resolve with Toradol.  Labs did show hypokalemia, but 2 troponins greater than 3 hours apart were both negative.  Hypokalemia did improve after supplementation in the emergency department.  EKG does not show any evidence of ischemia or arrhythmia.  As the pain was very reproducible on exam, improved with Toradol, with negative troponins, negative EKG, and low cardiac risk factors, I do not  see indication for admission or further evaluation in the emergency department as I do suspect musculoskeletal etiology.  Doubt PE, dissection, pneumothorax, Boerhaave's, ACS at this time.  I believe she can follow-up with her primary care provider as an outpatient.  Return precautions were discussed.  Patient verbalized understanding agreement with this plan    At the conclusion of the encounter I discussed the results of all of the tests and the disposition. The questions were answered. The patient or family acknowledged understanding and was agreeable with the care plan.      MEDICATIONS GIVEN IN THE EMERGENCY:  Medications   potassium chloride 10 mEq in 100 mL sterile water intermittent infusion (premix) (10 mEq Intravenous New Bag 11/1/21 2130)   ketorolac (TORADOL) injection 15 mg (15 mg Intravenous Given 11/1/21 2130)   potassium chloride (KLOR-CON) Packet 20 mEq (20 mEq Oral Given 11/1/21 2320)       NEW PRESCRIPTIONS STARTED AT TODAY'S ER VISIT  New Prescriptions    METHOCARBAMOL (ROBAXIN) 500 MG TABLET    Take 1 tablet (500 mg) by mouth 4 times daily as needed for muscle spasms        Haroon Cardenas D.O.  Emergency Medicine  [facility] EMERGENCY ROOM  [facility] [facility] [id number] [phone]  Dept: [phone]     Haroon Cardenas DO  11/02/21 0027

## 2021-11-02 NOTE — ED TRIAGE NOTES
The patient presents to the ED with chest pain and pain between her shoulder blades that is worse with deep inspiration and movement. The patient denies shortness of breath, dizziness, nausea or vomiting. The patient denies history of cardiac issues other than hypertension.

## 2021-11-08 DIAGNOSIS — I10 ESSENTIAL HYPERTENSION: ICD-10-CM

## 2021-11-09 RX ORDER — HYDROCHLOROTHIAZIDE 25 MG/1
TABLET ORAL
Qty: 90 TABLET | Refills: 3 | OUTPATIENT
Start: 2021-11-09

## 2021-11-10 ENCOUNTER — IMMUNIZATION (OUTPATIENT)
Dept: NURSING | Facility: CLINIC | Age: 61
End: 2021-11-10
Payer: COMMERCIAL

## 2021-11-10 PROCEDURE — 0004A PR COVID VAC PFIZER DIL RECON 30 MCG/0.3 ML IM: CPT

## 2021-11-10 PROCEDURE — 91300 PR COVID VAC PFIZER DIL RECON 30 MCG/0.3 ML IM: CPT

## 2021-11-15 ENCOUNTER — HOSPITAL ENCOUNTER (OUTPATIENT)
Dept: MAMMOGRAPHY | Facility: CLINIC | Age: 61
Discharge: HOME | End: 2021-11-15
Attending: FAMILY MEDICINE | Admitting: FAMILY MEDICINE
Payer: COMMERCIAL

## 2021-11-15 DIAGNOSIS — Z12.31 VISIT FOR SCREENING MAMMOGRAM: ICD-10-CM

## 2021-11-15 PROCEDURE — 77067 SCR MAMMO BI INCL CAD: CPT

## 2021-11-22 ENCOUNTER — PATIENT OUTREACH (OUTPATIENT)
Dept: FAMILY MEDICINE | Facility: CLINIC | Age: 61
End: 2021-11-22

## 2021-11-22 NOTE — TELEPHONE ENCOUNTER
10/12/21 Colpo ECC non diagnostic, bx small focus of atypia and koilocytosis suggestive of HPV effect. Plan: cotest in 1 year

## 2021-12-02 ENCOUNTER — MYC MEDICAL ADVICE (OUTPATIENT)
Dept: FAMILY MEDICINE | Facility: CLINIC | Age: 61
End: 2021-12-02

## 2021-12-02 DIAGNOSIS — I10 ESSENTIAL HYPERTENSION: ICD-10-CM

## 2021-12-02 RX ORDER — LOSARTAN POTASSIUM 50 MG/1
50 TABLET ORAL DAILY
Qty: 90 TABLET | Refills: 3 | Status: SHIPPED | OUTPATIENT
Start: 2021-12-02 | End: 2022-10-31

## 2021-12-02 RX ORDER — HYDROCHLOROTHIAZIDE 25 MG/1
25 TABLET ORAL DAILY
Qty: 90 TABLET | Refills: 3 | Status: SHIPPED | OUTPATIENT
Start: 2021-12-02 | End: 2022-10-31

## 2022-02-01 ENCOUNTER — LAB (OUTPATIENT)
Dept: LAB | Facility: CLINIC | Age: 62
End: 2022-02-01
Attending: FAMILY MEDICINE
Payer: COMMERCIAL

## 2022-02-01 DIAGNOSIS — Z20.822 CLOSE EXPOSURE TO 2019 NOVEL CORONAVIRUS: ICD-10-CM

## 2022-02-01 LAB — SARS-COV-2 RNA RESP QL NAA+PROBE: NORMAL

## 2022-02-01 PROCEDURE — U0003 INFECTIOUS AGENT DETECTION BY NUCLEIC ACID (DNA OR RNA); SEVERE ACUTE RESPIRATORY SYNDROME CORONAVIRUS 2 (SARS-COV-2) (CORONAVIRUS DISEASE [COVID-19]), AMPLIFIED PROBE TECHNIQUE, MAKING USE OF HIGH THROUGHPUT TECHNOLOGIES AS DESCRIBED BY CMS-2020-01-R: HCPCS | Mod: 90

## 2022-02-01 PROCEDURE — U0005 INFEC AGEN DETEC AMPLI PROBE: HCPCS | Mod: 90

## 2022-02-01 PROCEDURE — 99000 SPECIMEN HANDLING OFFICE-LAB: CPT

## 2022-02-02 LAB — SARS-COV-2 RNA RESP QL NAA+PROBE: NOT DETECTED

## 2022-02-15 ENCOUNTER — OFFICE VISIT (OUTPATIENT)
Dept: FAMILY MEDICINE | Facility: CLINIC | Age: 62
End: 2022-02-15
Payer: COMMERCIAL

## 2022-02-15 VITALS
HEART RATE: 68 BPM | SYSTOLIC BLOOD PRESSURE: 134 MMHG | DIASTOLIC BLOOD PRESSURE: 72 MMHG | BODY MASS INDEX: 33.86 KG/M2 | TEMPERATURE: 98.9 F | WEIGHT: 179.2 LBS | OXYGEN SATURATION: 99 %

## 2022-02-15 DIAGNOSIS — B34.9 VIRAL SYNDROME: Primary | ICD-10-CM

## 2022-02-15 PROCEDURE — 99213 OFFICE O/P EST LOW 20 MIN: CPT | Performed by: FAMILY MEDICINE

## 2022-02-16 NOTE — PROGRESS NOTES
ASSESSMENT/PLAN:  Sierra was seen today for gi problem.    Diagnoses and all orders for this visit:    Viral syndrome  Improving at this time.  Continue with supportive cares.  Follow-up if needed    SUBJECTIVE:    Sierra Danielle is a 61 year old female who came in today     This is a 61-year-old female who comes in today for concerns regarding nausea, vomiting, diarrhea, feeling feverish, chills, myalgia.  Symptoms started about 6 days ago.  Today she is feeling little better.  Most of her symptoms have subsided.  Still with loose stools but they are forming much better now.  She has about 2 stools per day.  Still with intermittent abdominal cramping.  Still with decreased appetite.  Still with malaise.  Overall feeling a little better.      No blood in the stool.  No vomiting of blood.  No recent travel.  No recent hospitalization.  No one else in the family with similar symptoms.  No cough.  No sore throat.  No known contact with Covid.  She had to Covid tests that were negative    Review of Systems (except those mentioned above)  Constitutional: Negative.   HENT: Negative.   Eyes: Negative.   Respiratory: Negative.   Cardiovascular: Negative.   Gastrointestinal: Negative.   Endocrine: Negative.   Genitourinary: Negative.   Musculoskeletal: Negative.   Skin: Negative.   Allergic/Immunologic: Negative.   Neurological: Negative.   Hematological: Negative.   Psychiatric/Behavioral: Negative.     Patient Active Problem List    Diagnosis Date Noted     Essential hypertension 02/10/2021     Priority: Medium     breast cancer right, s/p lumpectomy 09/16/2020     Priority: Medium     ASCUS with positive high risk HPV cervical 01/24/2018     Priority: Medium     1/24/18 ASCUS, +HR HPV (not 16/18)  9/16/20 NIL pap, +HR HPV (not 16/18). Plan: cotest in 1 year per provider  9/23/21 NIL pap, +HR HPV (not 16/18). Plan: colposcopy due before 12/23/21  10/12/21 Colpo ECC non diagnostic, bx small focus of atypia and  koilocytosis suggestive of HPV effect. Plan: cotest in 1 year       Allergies      Priority: Medium     Created by Conversion         No Known Allergies  Current Outpatient Medications   Medication Sig Dispense Refill     hydrochlorothiazide (HYDRODIURIL) 25 MG tablet Take 1 tablet (25 mg) by mouth daily 90 tablet 3     losartan (COZAAR) 50 MG tablet Take 1 tablet (50 mg) by mouth daily 90 tablet 3     Past Medical History:   Diagnosis Date     ASCUS with positive high risk HPV cervical 1/24/2018     Breast cancer (H) 2006     Heart murmur      Past Surgical History:   Procedure Laterality Date     BIOPSY BREAST Right 2005     CHOLECYSTECTOMY       EXCISE BREAST CYST/FIBROADENOMA/TUMOR/DUCT LESION/NIPPLE LESION/AREOLAR LESION      Description: Breast Surgery Lumpectomy;  Recorded: 05/01/2012;     LUMPECTOMY BREAST       ZZC LAP,CHOLECYSTECTOMY/EXPLORE      Description: Cholecystectomy Laparoscopic;  Recorded: 11/17/2008;     Social History     Socioeconomic History     Marital status:      Spouse name: None     Number of children: None     Years of education: None     Highest education level: None   Occupational History     None   Tobacco Use     Smoking status: Never Smoker     Smokeless tobacco: Never Used   Substance and Sexual Activity     Alcohol use: None     Drug use: None     Sexual activity: None   Other Topics Concern     None   Social History Narrative     None     Social Determinants of Health     Financial Resource Strain: Not on file   Food Insecurity: Not on file   Transportation Needs: Not on file   Physical Activity: Not on file   Stress: Not on file   Social Connections: Not on file   Intimate Partner Violence: Not on file   Housing Stability: Not on file     Family History   Problem Relation Age of Onset     Breast Cancer Paternal Aunt 60.00     Coronary Artery Disease Father      Pancreatic Cancer Mother          OBJECTIVE:    Vitals:    02/15/22 1228   BP: 134/72   BP Location: Left arm    Patient Position: Sitting   Cuff Size: Adult Regular   Pulse: 68   Temp: 98.9  F (37.2  C)   TempSrc: Axillary   SpO2: 99%   Weight: 81.3 kg (179 lb 3.2 oz)     Body mass index is 33.86 kg/m .    Physical Exam:  Constitutional: Patient was oriented to person, place, and time. Patient appeared well-developed and well-nourished. No distress.   Head: Normocephalic and atraumatic.   Right Ear: External ear normal. Normal TM  Left Ear: External ear normal. Normal TM  Eyes: Conjunctivae and EOM were normal. Pupils were equal, round, and reactive to light. Right eye exhibited no discharge. Left eye exhibited no discharge. No scleral icterus.   Neck: Neck supple. No JVD present. No tracheal deviation present. No thyromegaly present.   Lymphadenopathy:  Patient has no cervical adenopathy.   Cardiovascular: Normal rate, regular rhythm, normal heart sounds and intact distal pulses. No murmur heard.   Pulmonary/Chest: Effort normal and breath sounds normal. No stridor. No respiratory distress. Patient had no wheezes, no rales, exhibits no tenderness.   Abdominal: Soft. Bowel sounds were normal. Patient exhibited no distension and no mass. There was no tenderness. There was no rebound and no guarding.   Skin: Skin was warm and dry. No rash noted. Patient was not diaphoretic. No erythema. No pallor.     Answers for HPI/ROS submitted by the patient on 2/15/2022  How many servings of fruits and vegetables do you eat daily?: 2-3  On average, how many sweetened beverages do you drink each day (Examples: soda, juice, sweet tea, etc.  Do NOT count diet or artificially sweetened beverages)?: 0  How many minutes a day do you exercise enough to make your heart beat faster?: 9 or less  How many days a week do you exercise enough to make your heart beat faster?: 3 or less  How many days per week do you miss taking your medication?: 0

## 2022-09-21 ENCOUNTER — PATIENT OUTREACH (OUTPATIENT)
Dept: FAMILY MEDICINE | Facility: CLINIC | Age: 62
End: 2022-09-21

## 2022-09-21 NOTE — LETTER
September 21, 2022      Sierra VÍCTOR Lolis  [address]  [place] [address]        Dear ,    This letter is to remind you that you are due for your follow-up Pap smear and Human Papillomavirus (HPV) test.    Please call [phone] to schedule your appointment at your earliest convenience.    If you have completed the appointment outside of the [facility] system, please have the records forwarded to our office. We will update your chart for your provider to review before your next annual wellness visit.     Thank you for choosing [facility]!      Sincerely,    Your [facility] Care Team

## 2022-09-24 ENCOUNTER — HEALTH MAINTENANCE LETTER (OUTPATIENT)
Age: 62
End: 2022-09-24

## 2022-10-21 ENCOUNTER — IMMUNIZATION (OUTPATIENT)
Dept: NURSING | Facility: CLINIC | Age: 62
End: 2022-10-21
Payer: COMMERCIAL

## 2022-10-21 PROCEDURE — 0124A COVID-19,PF,PFIZER BOOSTER BIVALENT: CPT

## 2022-10-21 PROCEDURE — 90471 IMMUNIZATION ADMIN: CPT

## 2022-10-21 PROCEDURE — 91312 COVID-19,PF,PFIZER BOOSTER BIVALENT: CPT

## 2022-10-21 PROCEDURE — 90682 RIV4 VACC RECOMBINANT DNA IM: CPT

## 2022-10-31 ENCOUNTER — OFFICE VISIT (OUTPATIENT)
Dept: FAMILY MEDICINE | Facility: CLINIC | Age: 62
End: 2022-10-31
Payer: COMMERCIAL

## 2022-10-31 VITALS
HEART RATE: 90 BPM | DIASTOLIC BLOOD PRESSURE: 80 MMHG | OXYGEN SATURATION: 97 % | WEIGHT: 201.72 LBS | BODY MASS INDEX: 37.12 KG/M2 | SYSTOLIC BLOOD PRESSURE: 132 MMHG | TEMPERATURE: 98.6 F | HEIGHT: 62 IN

## 2022-10-31 DIAGNOSIS — I10 ESSENTIAL HYPERTENSION: ICD-10-CM

## 2022-10-31 DIAGNOSIS — C50.911 MALIGNANT NEOPLASM OF RIGHT FEMALE BREAST, UNSPECIFIED ESTROGEN RECEPTOR STATUS, UNSPECIFIED SITE OF BREAST (H): ICD-10-CM

## 2022-10-31 DIAGNOSIS — Z12.31 ENCOUNTER FOR SCREENING MAMMOGRAM FOR BREAST CANCER: ICD-10-CM

## 2022-10-31 DIAGNOSIS — Z00.00 ROUTINE ADULT HEALTH MAINTENANCE: Primary | ICD-10-CM

## 2022-10-31 DIAGNOSIS — Z12.4 CERVICAL CANCER SCREENING: ICD-10-CM

## 2022-10-31 DIAGNOSIS — Z12.11 SCREEN FOR COLON CANCER: ICD-10-CM

## 2022-10-31 PROCEDURE — 99213 OFFICE O/P EST LOW 20 MIN: CPT | Mod: 25 | Performed by: FAMILY MEDICINE

## 2022-10-31 PROCEDURE — G0145 SCR C/V CYTO,THINLAYER,RESCR: HCPCS | Performed by: FAMILY MEDICINE

## 2022-10-31 PROCEDURE — 87624 HPV HI-RISK TYP POOLED RSLT: CPT | Performed by: FAMILY MEDICINE

## 2022-10-31 PROCEDURE — 99396 PREV VISIT EST AGE 40-64: CPT | Mod: 25 | Performed by: FAMILY MEDICINE

## 2022-10-31 RX ORDER — HYDROCHLOROTHIAZIDE 25 MG/1
25 TABLET ORAL DAILY
Qty: 90 TABLET | Refills: 4 | Status: SHIPPED | OUTPATIENT
Start: 2022-10-31

## 2022-10-31 RX ORDER — LOSARTAN POTASSIUM 50 MG/1
50 TABLET ORAL DAILY
Qty: 90 TABLET | Refills: 4 | Status: SHIPPED | OUTPATIENT
Start: 2022-10-31

## 2022-10-31 NOTE — PROGRESS NOTES
SUBJECTIVE:   CC: Raj is an 62 year old who presents for preventive health visit.         History of Present Illness       Reason for visit:  Routine pap    She eats 0-1 servings of fruits and vegetables daily.She consumes 0 sweetened beverage(s) daily.She exercises with enough effort to increase her heart rate 9 or less minutes per day.  She exercises with enough effort to increase her heart rate 3 or less days per week.   She is taking medications regularly.    Ability to successfully perform activities of daily living: Yes, no assistance needed  Home safety:  none identified     Today's PHQ-2 Score:   PHQ-2 ( 1999 Pfizer) 2/15/2022   Q1: Little interest or pleasure in doing things 0   Q2: Feeling down, depressed or hopeless 0   PHQ-2 Score 0   PHQ-2 Total Score (12-17 Years)- Positive if 3 or more points; Administer PHQ-A if positive -   Q1: Little interest or pleasure in doing things Not at all   Q2: Feeling down, depressed or hopeless Not at all   PHQ-2 Score 0       Abuse: Current or Past (Physical, Sexual or Emotional) - No  Do you feel safe in your environment? Yes    Social History     Tobacco Use     Smoking status: Never     Smokeless tobacco: Never   Substance Use Topics     Alcohol use: Not on file     Reviewed orders with patient.  Reviewed health maintenance and updated orders accordingly - Yes  Lab work is in process    Breast Cancer Screening:    FHS-7:   Breast CA Risk Assessment (FHS-7) 11/15/2021   Did any of your first-degree relatives have breast or ovarian cancer? No   Did any of your relatives have bilateral breast cancer? No   Did any man in your family have breast cancer? No   Did any woman in your family have breast and ovarian cancer? No   Did any woman in your family have breast cancer before age 50 y? No   Do you have 2 or more relatives with breast and/or ovarian cancer? No   Do you have 2 or more relatives with breast and/or bowel cancer? No     click delete button to remove this  line now  yearly screening  Pertinent mammograms are reviewed under the imaging tab.    History of abnormal Pap smear: NO - age 30-65 PAP every 5 years with negative HPV co-testing recommended  PAP / HPV Latest Ref Rng & Units 9/23/2021 9/16/2020 1/24/2018   PAP   Negative for Intraepithelial Lesion or Malignancy (NILM) Negative for squamous intraepithelial lesion or malignancy  Electronically signed by Alexus Aguilar CT (ASCP) on 9/25/2020 at 11:45 AM   Atypical squamous cells of undetermined significance  Electronically signed by Den Oliver MD on 1/30/2018 at  8:48 AM     HPV16 Negative Negative Negative Negative   HPV18 Negative Negative Negative Negative   HRHPV Negative Positive(A) Positive(A) Positive(A)     Reviewed and updated as needed this visit by clinical staff   Tobacco  Allergies  Meds              Reviewed and updated as needed this visit by Provider                 Past Medical History:   Diagnosis Date     ASCUS with positive high risk HPV cervical 1/24/2018     Breast cancer (H) 2006     Heart murmur       Past Surgical History:   Procedure Laterality Date     BIOPSY BREAST Right 2005     CHOLECYSTECTOMY       EXCISE BREAST CYST/FIBROADENOMA/TUMOR/DUCT LESION/NIPPLE LESION/AREOLAR LESION      Description: Breast Surgery Lumpectomy;  Recorded: 05/01/2012;     LUMPECTOMY BREAST       ZZC LAP,CHOLECYSTECTOMY/EXPLORE      Description: Cholecystectomy Laparoscopic;  Recorded: 11/17/2008;       Review of Systems  CONSTITUTIONAL: NEGATIVE for fever, chills, change in weight  INTEGUMENTARY/SKIN: NEGATIVE for worrisome rashes, moles or lesions  EYES: NEGATIVE for vision changes or irritation  ENT: NEGATIVE for ear, mouth and throat problems  RESP: NEGATIVE for significant cough or SOB  BREAST: NEGATIVE for masses, tenderness or discharge  CV: NEGATIVE for chest pain, palpitations or peripheral edema  GI: NEGATIVE for nausea, abdominal pain, heartburn, or change in bowel habits  :  "NEGATIVE for unusual urinary or vaginal symptoms. No vaginal bleeding.  MUSCULOSKELETAL: NEGATIVE for significant arthralgias or myalgia  NEURO: NEGATIVE for weakness, dizziness or paresthesias  PSYCHIATRIC: NEGATIVE for changes in mood or affect      OBJECTIVE:   /80 (BP Location: Left arm, Patient Position: Sitting, Cuff Size: Adult Regular)   Pulse 90   Temp 98.6  F (37  C) (Oral)   Ht 1.562 m (5' 1.5\")   Wt 91.5 kg (201 lb 11.5 oz)   SpO2 97%   BMI 37.50 kg/m    Physical Exam  GENERAL APPEARANCE: healthy, alert and no distress  EYES: Eyes grossly normal to inspection, PERRL and conjunctivae and sclerae normal  HENT: ear canals and TM's normal  NECK: no adenopathy, no asymmetry, masses, or scars and thyroid normal to palpation  RESP: lungs clear to auscultation - no rales, rhonchi or wheezes  BREAST: normal without masses, tenderness or nipple discharge and no palpable axillary masses or adenopathy  CV: regular rate and rhythm, normal S1 S2, no S3 or S4, no murmur, click or rub, no peripheral edema and peripheral pulses strong  ABDOMEN: soft, nontender, no hepatosplenomegaly, no masses and bowel sounds normal  MS: no musculoskeletal defects are noted and gait is age appropriate without ataxia  SKIN: no suspicious lesions or rashes  NEURO: Normal strength and tone, sensory exam grossly normal, mentation intact and speech normal  PSYCH: mentation appears normal and affect normal/bright    Objective:    Physical Exam   /80 (BP Location: Left arm, Patient Position: Sitting, Cuff Size: Adult Regular)   Pulse 90   Temp 98.6  F (37  C) (Oral)   Ht 1.562 m (5' 1.5\")   Wt 91.5 kg (201 lb 11.5 oz)   SpO2 97%   BMI 37.50 kg/m     Constitutional: Patient is oriented to person, place, and time. Patient appears well-developed and well-nourished. No distress.   Head: Normocephalic and atraumatic.   Right Ear: External ear normal. Normal TM  Left Ear: External ear normal. Normal TM  Eyes: Conjunctivae and " EOM are normal. Pupils are equal, round, and reactive to light. Right eye exhibits no discharge. Left eye exhibits no discharge. No scleral icterus.   Neck: Neck supple. No JVD present. No tracheal deviation present. No thyromegaly present.   Cardiovascular: Normal rate, regular rhythm, normal heart sounds and intact distal pulses. No murmur heard.   Pulmonary/Chest: Effort normal and breath sounds normal. No stridor. No respiratory distress. Patient has no wheezes, no rales, exhibits no tenderness.   Abdominal: Soft. Patient exhibits no distension and no mass. There is no tenderness. There is no rebound and no guarding.   Lymphadenopathy:  Patient has no cervical adenopathy.   Neurological: Patient is alert and oriented to person, place, and time. No cranial nerve deficit. Coordination normal.   Skin: Skin is warm and dry. No rash noted. Patient is not diaphoretic. No erythema. No pallor.   Pelvic:  Normal external genitalia with Normal vulva.  Normal vagina with no polyps or lesions and with physiologic discharge.  Normal cervix with normal mucosa and without CMT.  No adnexal masses     Diagnostic Test Results:  Labs reviewed in Epic    ASSESSMENT/PLAN:   Sierra was seen today for gyn exam.    Diagnoses and all orders for this visit:    Routine adult health maintenance  -     REVIEW OF HEALTH MAINTENANCE PROTOCOL ORDERS  We discussed healthy lifestyle, nutrition, cardiovascular risk reduction, self care, safety, sunscreen, and timing of cancer screening.  Health maintenance screening and immunizations reviewed with the patient.  Follow up yearly for the annual physical.     Screen for colon cancer  -     Colonoscopy Screening  Referral; Future    Cervical cancer screening  -     Pap screen with HPV - recommended age 30 - 65 years    Encounter for screening mammogram for breast cancer  -     *MA Screening Digital Bilateral; Future    Essential hypertension  -     losartan (COZAAR) 50 MG tablet; Take 1  "tablet (50 mg) by mouth daily  -     hydrochlorothiazide (HYDRODIURIL) 25 MG tablet; Take 1 tablet (25 mg) by mouth daily  -     Lipid Profile (Chol, Trig, HDL, LDL calc); Future  -     Comprehensive metabolic panel (BMP + Alb, Alk Phos, ALT, AST, Total. Bili, TP); Future  Refilled  Follow-up yearly    Malignant neoplasm of right female breast, unspecified estrogen receptor status, unspecified site of breast (H)  S/p lumpetomy  No longer seeing oncology        COUNSELING:  Reviewed preventive health counseling, as reflected in patient instructions    Estimated body mass index is 37.5 kg/m  as calculated from the following:    Height as of this encounter: 1.562 m (5' 1.5\").    Weight as of this encounter: 91.5 kg (201 lb 11.5 oz).    Weight management plan: Discussed healthy diet and exercise guidelines    She reports that she has never smoked. She has never used smokeless tobacco.      Counseling Resources:  ATP IV Guidelines  Pooled Cohorts Equation Calculator  Breast Cancer Risk Calculator  BRCA-Related Cancer Risk Assessment: FHS-7 Tool  FRAX Risk Assessment  ICSI Preventive Guidelines  Dietary Guidelines for Americans, 2010  USDA's MyPlate  ASA Prophylaxis  Lung CA Screening    Taryn Velarde MD  [facility]"

## 2022-11-02 LAB
BKR LAB AP GYN ADEQUACY: NORMAL
BKR LAB AP GYN INTERPRETATION: NORMAL
BKR LAB AP HPV REFLEX: NORMAL
BKR LAB AP PREVIOUS ABNL DX: NORMAL
BKR LAB AP PREVIOUS ABNORMAL: NORMAL
PATH REPORT.COMMENTS IMP SPEC: NORMAL
PATH REPORT.COMMENTS IMP SPEC: NORMAL
PATH REPORT.RELEVANT HX SPEC: NORMAL

## 2022-11-03 ENCOUNTER — HOSPITAL ENCOUNTER (OUTPATIENT)
Facility: CLINIC | Age: 62
End: 2022-11-03
Attending: COLON & RECTAL SURGERY | Admitting: COLON & RECTAL SURGERY
Payer: COMMERCIAL

## 2022-11-03 ENCOUNTER — TELEPHONE (OUTPATIENT)
Dept: GASTROENTEROLOGY | Facility: CLINIC | Age: 62
End: 2022-11-03

## 2022-11-03 DIAGNOSIS — Z12.11 SPECIAL SCREENING FOR MALIGNANT NEOPLASMS, COLON: Primary | ICD-10-CM

## 2022-11-03 NOTE — TELEPHONE ENCOUNTER
Screening Questions  BLUE  KIND OF PREP RED  LOCATION [review exclusion criteria] GREEN  SEDATION TYPE        Y Are you active on mychart?       Taryn Cuellar MD in [facility] FAMILY MEDICINE/OB Ordering/Referring Provider?        [employer] What type of coverage do you have?      N Have you had a positive covid test in the last 90 days?     37.4 1. BMI  [BMI 40+ - review exclusion criteria]    Y  2. Are you able to give consent for your medical care? [IF NO,RN REVIEW]        N  3. Are you taking any prescription pain medications on a routine schedule?      N  3a. EXTENDED PREP What kind of prescription?     N 4. Do you have any chemical dependencies such as alcohol, street drugs, or methadone?    N 5. Do you have any history of post-traumatic stress syndrome, severe anxiety or history of psychosis?      **If yes 3- 5 , please schedule with MAC sedation.**          IF YES TO ANY 6 - 10 - HOSPITAL SETTING ONLY.     N 6.   Do you need assistance transferring?     N 7.   Have you had a heart or lung transplant?    N 8.   Are you currently on dialysis?   N 9.   Do you use daily home oxygen?   N 10. Do you take nitroglycerin?   10a. N If yes, how often?     11. [FEMALES]  N Are you currently pregnant?    11a. N If yes, how many weeks? [ Greater than 12 weeks, OR NEEDED]    N 12. Do you have Pulmonary Hypertension? *NEED PAC APPT AT [facility]*     N 13. [review exclusion criteria]  Do you have any implantable devices in your body (pacemaker, defib, LVAD)?    N 14. In the past 6 months, have you had any heart related issues including cardiomyopathy or heart attack?     14a. N If yes, did it require cardiac stenting if so when?     N 15. Have you had a stroke or Transient ischemic attack (TIA - aka  mini stroke ) within 6 months?      N 16. Do you have mod to severe Obstructive Sleep Apnea?  [Hospital only - Ok at [facility]]    N 17. Do you have SEVERE AND UNCONTROLLED asthma? *NEED PAC APPT AT [facility]*     N 18. Are you  "currently taking any blood thinners?     18a. If yes, inform patient to \"follow up w/ ordering provider for bridging instructions.\"    N 19. Do you take the medication Phentermine?    19a. If yes, \"Hold for 7 days before procedure.  Please consult your prescribing provider if you have questions about holding this medication.\"     N  20. Do you have chronic kidney disease?      N  21. Do you have a diagnosis of diabetes?     N  22. On a regular basis do you go 3-5 days between bowel movements?      23. Preferred LOCAL Pharmacy for Pre Prescription    [ LIST ONLY ONE PHARMACY]     [id number] [place], [place] - [address] AT [facility] OF [address]    - CLOSING REMINDERS -    Informed patient they will need an adult    Cannot take any type of public or medical transportation alone    Conscious Sedation- Needs  for 6 hours after the procedure       MAC/General-Needs  for 24 hours after procedure    Pre-Procedure Covid test to be completed [[facility] PCR Testing Required]    Confirmed Nurse will call to complete assessment       - SCHEDULING DETAILS -     Sameera  Surgeon    12/22   Date of Procedure  Lower Endoscopy [Colonoscopy]  Type of Procedure Scheduled    Location  [facility]-If you answer yes to questions #8, #20, #21Which Colonoscopy Prep was Sent?     moderate Sedation Type     n PAC / Pre-op Required       Additional comments:  pt elected SH  To be seen in December        "

## 2022-11-04 LAB
HUMAN PAPILLOMA VIRUS 16 DNA: NEGATIVE
HUMAN PAPILLOMA VIRUS 18 DNA: NEGATIVE
HUMAN PAPILLOMA VIRUS FINAL DIAGNOSIS: NORMAL
HUMAN PAPILLOMA VIRUS OTHER HR: NEGATIVE

## 2022-11-07 ENCOUNTER — PATIENT OUTREACH (OUTPATIENT)
Dept: FAMILY MEDICINE | Facility: CLINIC | Age: 62
End: 2022-11-07

## 2022-11-29 ENCOUNTER — HOSPITAL ENCOUNTER (OUTPATIENT)
Dept: MAMMOGRAPHY | Facility: CLINIC | Age: 62
Discharge: HOME | End: 2022-11-29
Attending: FAMILY MEDICINE | Admitting: FAMILY MEDICINE
Payer: COMMERCIAL

## 2022-11-29 DIAGNOSIS — Z12.31 ENCOUNTER FOR SCREENING MAMMOGRAM FOR BREAST CANCER: ICD-10-CM

## 2022-11-29 PROCEDURE — 77067 SCR MAMMO BI INCL CAD: CPT

## 2022-12-15 RX ORDER — BISACODYL 5 MG
TABLET, DELAYED RELEASE (ENTERIC COATED) ORAL
Qty: 4 TABLET | Refills: 0 | Status: SHIPPED | OUTPATIENT
Start: 2022-12-15 | End: 2022-12-20 | Stop reason: HOSPADM

## 2022-12-20 ENCOUNTER — TELEPHONE (OUTPATIENT)
Dept: GASTROENTEROLOGY | Facility: CLINIC | Age: 62
End: 2022-12-20

## 2022-12-20 ENCOUNTER — HOSPITAL ENCOUNTER (OUTPATIENT)
Facility: CLINIC | Age: 62
End: 2022-12-20
Attending: COLON & RECTAL SURGERY | Admitting: COLON & RECTAL SURGERY
Payer: COMMERCIAL

## 2022-12-20 DIAGNOSIS — Z12.11 ENCOUNTER FOR SCREENING COLONOSCOPY: Primary | ICD-10-CM

## 2022-12-20 NOTE — TELEPHONE ENCOUNTER
Caller: Sierra Danielle  Reason for Reschedule/Cancellation (please be detailed, any staff messages or encounters to note?): schedule conflict      Prior to reschedule please review:    Ordering Provider: Taryn Cuellar    Sedation per order: Moderate    Does patient have any ASC Exclusions, please identify?: N      Notes on Cancelled Procedure:    Procedure:Lower Endoscopy [Colonoscopy]     Date: 12/22/22    Location:[facility]; [address] [place] [address]    Surgeon: Sameera        Rescheduled: Yes    Procedure: Lower Endoscopy [Colonoscopy]     Date: 02/06/23    Location:[facility]; [address] [place] [address]    Surgeon: Sameera    Sedation Level Scheduled  moderate,  Reason for Sedation Level per screening questions    Prep/Instructions updated and sent: yes

## 2023-01-31 RX ORDER — BISACODYL 5 MG
TABLET, DELAYED RELEASE (ENTERIC COATED) ORAL
Qty: 4 TABLET | Refills: 0 | Status: SHIPPED | OUTPATIENT
Start: 2023-01-31 | End: 2023-02-02 | Stop reason: HOSPADM

## 2023-02-02 ENCOUNTER — MYC MEDICAL ADVICE (OUTPATIENT)
Dept: FAMILY MEDICINE | Facility: CLINIC | Age: 63
End: 2023-02-02
Payer: COMMERCIAL

## 2023-02-02 ENCOUNTER — NURSE TRIAGE (OUTPATIENT)
Dept: FAMILY MEDICINE | Facility: CLINIC | Age: 63
End: 2023-02-02
Payer: COMMERCIAL

## 2023-02-02 NOTE — TELEPHONE ENCOUNTER
Writer called pt and triaged pt's symptoms. Pt declined to be considered for antiviral therapy after triage.    Judith Sharma RN

## 2023-02-02 NOTE — TELEPHONE ENCOUNTER
S-(situation):   Pt tested positive for COVID 02/01/2023. Pt wanting to know about treatment options.     B-(background):   Symptom onset: Monday, 01/30/2023 scratchy throat  Pt using OTC meds: ibuprofen and Emergen-C to treat symptoms.   Home COVID test x2 on 02/01/2023, positive    A-(assessment):   Sore throat, moderate, continuous  Trouble sleeping due to discomfort  Cough, moderate, intermittent, non-productive  Runny nose, thin, clear drainage  Headache, mild, intermittent, ibuprofen effective for symptom relief  Body aches, mild, continuous  Lethargy, mild  Afebrile  Denies SOB or difficulty breathing    R-(recommendations):   Home care. Care advice given. Discussed paxlovid. Pt not interested in being prescribed paxlovid at this time. Noted pt will be eligible to be considered for paxlovid until Saturday, 02/04/2023.Pt will continue with symptom management with OTC medications. Noted pt can send [facility] message or call RN triage if they have any other questions or concerns. Pt verbally acknowledged communication.    Judith Sharma RN    Reason for Disposition    [1] COVID-19 diagnosed by positive lab test (e.g., PCR, rapid self-test kit) AND [2] mild symptoms (e.g., cough, fever, others) AND [3] no complications or SOB    Additional Information    Negative: SEVERE difficulty breathing (e.g., struggling for each breath, speaks in single words)    Negative: Difficult to awaken or acting confused (e.g., disoriented, slurred speech)    Negative: Bluish (or gray) lips or face now    Negative: Shock suspected (e.g., cold/pale/clammy skin, too weak to stand, low BP, rapid pulse)    Negative: Sounds like a life-threatening emergency to the triager    Negative: [1] Diagnosed or suspected COVID-19 AND [2] symptoms lasting 3 or more weeks    Negative: [1] COVID-19 exposure AND [2] no symptoms    Negative: COVID-19 vaccine reaction suspected (e.g., fever, headache, muscle aches) occurring 1 to 3 days after getting  vaccine    Negative: COVID-19 vaccine, questions about    Negative: [1] Lives with someone known to have influenza (flu test positive) AND [2] flu-like symptoms (e.g., cough, runny nose, sore throat, SOB; with or without fever)    Negative: [1] Adult with possible COVID-19 symptoms AND [2] triager concerned about severity of symptoms or other causes    Negative: COVID-19 and breastfeeding, questions about    Negative: SEVERE or constant chest pain or pressure  (Exception: Mild central chest pain, present only when coughing.)    Negative: MODERATE difficulty breathing (e.g., speaks in phrases, SOB even at rest, pulse 100-120)    Negative: Headache and stiff neck (can't touch chin to chest)    Negative: Oxygen level (e.g., pulse oximetry) 90 percent or lower    Negative: Chest pain or pressure  (Exception: MILD central chest pain, present only when coughing)    Negative: Patient sounds very sick or weak to the triager    Negative: MILD difficulty breathing (e.g., minimal/no SOB at rest, SOB with walking, pulse <100)    Negative: Fever > 103 F (39.4 C)    Negative: [1] Fever > 101 F (38.3 C) AND [2] over 60 years of age    Negative: [1] Fever > 100.0 F (37.8 C) AND [2] bedridden (e.g., nursing home patient, CVA, chronic illness, recovering from surgery)    Negative: [1] HIGH RISK for severe COVID complications (e.g., weak immune system, age > 64 years, obesity with BMI of 30 or higher, pregnant, chronic lung disease or other chronic medical condition) AND [2] COVID symptoms (e.g., cough, fever)  (Exceptions: Already seen by PCP and no new or worsening symptoms.)    Negative: [1] HIGH RISK patient AND [2] influenza is widespread in the community AND [3] ONE OR MORE respiratory symptoms: cough, sore throat, runny or stuffy nose    Negative: [1] HIGH RISK patient AND [2] influenza exposure within the last 7 days AND [3] ONE OR MORE respiratory symptoms: cough, sore throat, runny or stuffy nose    Negative: Oxygen level  "(e.g., pulse oximetry) 91 to 94 percent    Negative: [1] COVID-19 infection suspected by caller or triager AND [2] mild symptoms (cough, fever, or others) AND [3] negative COVID-19 rapid test    Negative: Fever present > 3 days (72 hours)    Negative: [1] Fever returns after gone for over 24 hours AND [2] symptoms worse or not improved    Negative: [1] Continuous (nonstop) coughing interferes with work or school AND [2] no improvement using cough treatment per Care Advice    Negative: Cough present > 3 weeks    Negative: [1] COVID-19 diagnosed by positive lab test (e.g., PCR, rapid self-test kit) AND [2] NO symptoms (e.g., cough, fever, others)    Answer Assessment - Initial Assessment Questions  1. COVID-19 DIAGNOSIS: \"Who made your COVID-19 diagnosis?\" \"Was it confirmed by a positive lab test or self-test?\" If not diagnosed by a doctor (or NP/PA), ask \"Are there lots of cases (community spread) where you live?\" Note: See public health department website, if unsure.      02/01/2022, home test  2. COVID-19 EXPOSURE: \"Was there any known exposure to COVID before the symptoms began?\" CDC Definition of close contact: within 6 feet (2 meters) for a total of 15 minutes or more over a 24-hour period.       unknown  3. ONSET: \"When did the COVID-19 symptoms start?\"       Monday, 01/30/2023  4. WORST SYMPTOM: \"What is your worst symptom?\" (e.g., cough, fever, shortness of breath, muscle aches)      Cough and sore throat  5. COUGH: \"Do you have a cough?\" If Yes, ask: \"How bad is the cough?\"        Yes, moderate  6. FEVER: \"Do you have a fever?\" If Yes, ask: \"What is your temperature, how was it measured, and when did it start?\"      denies  7. RESPIRATORY STATUS: \"Describe your breathing?\" (e.g., shortness of breath, wheezing, unable to speak)       denies  8. BETTER-SAME-WORSE: \"Are you getting better, staying the same or getting worse compared to yesterday?\"  If getting worse, ask, \"In what way?\"      Better today  9. HIGH " "RISK DISEASE: \"Do you have any chronic medical problems?\" (e.g., asthma, heart or lung disease, weak immune system, obesity, etc.)      HTN  10. VACCINE: \"Have you had the COVID-19 vaccine?\" If Yes, ask: \"Which one, how many shots, when did you get it?\"        Yes, pfizer  11. BOOSTER: \"Have you received your COVID-19 booster?\" If Yes, ask: \"Which one and when did you get it?\"        Yes, pfizer  12. PREGNANCY: \"Is there any chance you are pregnant?\" \"When was your last menstrual period?\"        postmenopausal  13. OTHER SYMPTOMS: \"Do you have any other symptoms?\"  (e.g., chills, fatigue, headache, loss of smell or taste, muscle pain, sore throat)        Sore throat, chills, insomnia, cough, no voice, runny nose, headache, achy body, lethargy  14. O2 SATURATION MONITOR:  \"Do you use an oxygen saturation monitor (pulse oximeter) at home?\" If Yes, ask \"What is your reading (oxygen level) today?\" \"What is your usual oxygen saturation reading?\" (e.g., 95%)        no    Protocols used: CORONAVIRUS (COVID-19) DIAGNOSED OR [id number]      "

## 2023-02-02 NOTE — TELEPHONE ENCOUNTER
Patient is calling (as was informed in My chart Message).    Patient states she is available at: [phone]

## 2023-04-15 ENCOUNTER — OFFICE VISIT (OUTPATIENT)
Dept: FAMILY MEDICINE | Facility: CLINIC | Age: 63
End: 2023-04-15
Payer: COMMERCIAL

## 2023-04-15 ENCOUNTER — ANCILLARY PROCEDURE (OUTPATIENT)
Dept: GENERAL RADIOLOGY | Facility: CLINIC | Age: 63
End: 2023-04-15
Attending: PHYSICIAN ASSISTANT
Payer: COMMERCIAL

## 2023-04-15 VITALS
BODY MASS INDEX: 38.4 KG/M2 | RESPIRATION RATE: 16 BRPM | OXYGEN SATURATION: 98 % | SYSTOLIC BLOOD PRESSURE: 150 MMHG | HEART RATE: 69 BPM | WEIGHT: 206.6 LBS | DIASTOLIC BLOOD PRESSURE: 84 MMHG | TEMPERATURE: 98 F

## 2023-04-15 DIAGNOSIS — M54.12 CERVICAL RADICULOPATHY: Primary | ICD-10-CM

## 2023-04-15 PROCEDURE — 99214 OFFICE O/P EST MOD 30 MIN: CPT | Performed by: PHYSICIAN ASSISTANT

## 2023-04-15 PROCEDURE — 72040 X-RAY EXAM NECK SPINE 2-3 VW: CPT | Mod: TC | Performed by: RADIOLOGY

## 2023-04-15 NOTE — PATIENT INSTRUCTIONS
Ice topically to the area 20 minutes on each hour while awake.  Take precautions to avoid damage to the skin.  After 2 days, transition to ice topically 20 minutes 3 times per day.  After 2 days may add heat and alternate ice and heat.  Ibuprofen 600 mg (3 tablets) 3 times a day with food for analgesia and anti-inflammatory effect.  Do not use the ibuprofen if you have contraindications to using NSAIDs.    Return to see spine clinic for evaluation and treatment.

## 2023-04-15 NOTE — PROGRESS NOTES
Patient presents with:  Pain: In right arm for 2 weeks      Clinical Decision Making:  Patient has symptoms of cervical radiculopathy in a C7 and C8 distribution on examination.  However she did have some anterior listhesis C4-C5 noted on x-ray.  Referral to spine service for evaluation and treatment of her cervical radiculopathy. Expected course of resolution and indication for return was gone over and questions were answered to patient/parent's satisfaction before discharge.        ICD-10-CM    1. Cervical radiculopathy  M54.12 XR Cervical Spine 2/3 Views     Spine  Referral          Patient Instructions   Ice topically to the area 20 minutes on each hour while awake.  Take precautions to avoid damage to the skin.  After 2 days, transition to ice topically 20 minutes 3 times per day.  After 2 days may add heat and alternate ice and heat.  Ibuprofen 600 mg (3 tablets) 3 times a day with food for analgesia and anti-inflammatory effect.  Do not use the ibuprofen if you have contraindications to using NSAIDs.    Return to see spine clinic for evaluation and treatment.          HPI:  Sierra Danielle is a 63 year old female who presents today for 2-week worsening history of right-sided upper arm pain without precipitating event or injury.  This had been of insidious onset.  Patient has not had head neck back prior injury or surgeries to the neck.  Pain is a 6 out of 10 that exacerbates to a 10 out of 10 with movement especially with lifting with the right arm.  Patient has distribution of pain into the right scapular region and right shoulder right lateral aspect of the humerus and onto the right lateral condyle into the dorsal aspect of the forearm to the wrist.  Does not go into the hand.  Patient has not had weakness or dropping tools or other complaints.  At this time there is no involvement of left upper extremity.    Use of ibuprofen 400 mg intermittently with mild relief.    History obtained from chart  review and the patient.    Problem List:  2021-02: Essential hypertension  2020-09: breast cancer right, s/p lumpectomy  2018-01: ASCUS with positive high risk HPV cervical  Allergies      Past Medical History:   Diagnosis Date     ASCUS with positive high risk HPV cervical 1/24/2018     Breast cancer (H) 2006     Heart murmur        Social History     Tobacco Use     Smoking status: Never     Smokeless tobacco: Never   Vaping Use     Vaping status: Not on file   Substance Use Topics     Alcohol use: Not on file       Review of Systems  As above in HPI otherwise negative.    Vitals:    04/15/23 1446   BP: (!) 150/84   Pulse: 69   Resp: 16   Temp: 98  F (36.7  C)   TempSrc: Oral   SpO2: 98%   Weight: 93.7 kg (206 lb 9.6 oz)       General: Patient is resting comfortably no acute distress is afebrile  HEENT: Head is normocephalic atraumatic   Skin: Without rash non-diaphoretic  Musculoskeletal:  Patient has full range of motion in the neck with flexion extension rotation left and right with adduction.  This is nonpainful.  Spurling's maneuver is negative on the left and right side  There is pain in the deltoid lateral epicondyle and into the forearm into the wrist with a C7 and C8 distribution.  Strength is 5-5 and equal bilaterally upper extremities DTRs are 2 out of 4 at the biceps bilaterally.  Sensory intact all dermatomes in the right upper extremity      Physical Exam      Labs:  Results for orders placed or performed in visit on 04/15/23   XR Cervical Spine 2/3 Views     Status: None    Narrative    EXAM: XR CERVICAL SPINE 2/3 VIEWS  LOCATION: [facility] [place]  DATE/TIME: 4/15/2023 3:23 PM CDT    INDICATION: Right cervical radicular symptoms.  COMPARISON: None.      Impression    IMPRESSION: Straightening of the expected cervical lordosis with 1 mm anterior position C4-C5. Patent airway and appropriate cervical prevertebral soft tissues. No acute fracture or posttraumatic malalignment. Lung  apices are clear. Cervical spine is   otherwise negative.       I have personally ordered and preliminarily reviewed the following xray, I have noted degenerative joint changes and slight anterior spondylolithesis on C4-C5.    At the end of the encounter, I discussed results, diagnosis, medications. Discussed red flags for immediate return to clinic/ER, as well as indications for follow up if no improvement. Patient understood and agreed to plan. Patient was stable for discharge.

## 2023-04-20 NOTE — TELEPHONE ENCOUNTER
RECORDS RECEIVED FROM: Internal   REASON FOR VISIT: Cervical radiculopathy   Date of Appt: 5/3/23   NOTES (FOR ALL VISITS) STATUS DETAILS   OFFICE NOTE from referring provider Internal [place]15[place]jinder [place] [facility] [place]   [place] REPORT from the ER Internal 11/1/21 Haroon Cardenas DO @ [facility] ED   MEDICATION LIST Internal    IMAGING  (FOR ALL VISITS)     X-RAY Internal [facility]  4/15/23 XR Cervical Spine

## 2023-05-03 ENCOUNTER — OFFICE VISIT (OUTPATIENT)
Dept: NEUROSURGERY | Facility: CLINIC | Age: 63
End: 2023-05-03
Attending: PHYSICIAN ASSISTANT
Payer: COMMERCIAL

## 2023-05-03 ENCOUNTER — PRE VISIT (OUTPATIENT)
Dept: NEUROSURGERY | Facility: CLINIC | Age: 63
End: 2023-05-03

## 2023-05-03 VITALS
HEART RATE: 69 BPM | BODY MASS INDEX: 38.65 KG/M2 | WEIGHT: 207.9 LBS | DIASTOLIC BLOOD PRESSURE: 78 MMHG | SYSTOLIC BLOOD PRESSURE: 132 MMHG | OXYGEN SATURATION: 97 %

## 2023-05-03 DIAGNOSIS — M67.90 TENDINOPATHY OF UPPER EXTREMITY: ICD-10-CM

## 2023-05-03 DIAGNOSIS — M79.601 RIGHT ARM PAIN: Primary | ICD-10-CM

## 2023-05-03 DIAGNOSIS — M54.12 CERVICAL RADICULOPATHY: ICD-10-CM

## 2023-05-03 PROCEDURE — 99203 OFFICE O/P NEW LOW 30 MIN: CPT | Performed by: PHYSICIAN ASSISTANT

## 2023-05-03 ASSESSMENT — PAIN SCALES - GENERAL: PAINLEVEL: MODERATE PAIN (5)

## 2023-05-03 NOTE — LETTER
5/3/2023       RE: Sierra Danielle  [address]  [facility] [address]     Dear Colleague,    Thank you for referring your patient, Sierra Danielle, to the [facility] NEUROSURGERY CLINIC [place] at [facility]. Please see a copy of my visit note below.        Neurosurgery Clinic Note    Chief Complaint: right arm pain    History of Present Illness:  It was a pleasure to evaluate Sierra Danielle in clinic today at the kind referral of Baljinder Payne PA-C  [address] [place] [address].    Sierra Danielle is a 63 year old female who is presenting for evaluation of RUE pain.      Onset was 2 months ago after lifting grandson.  Pain is currently dull, but constant in the bicep area of her right arm.  She sometimes has it in her forearm as well, but it never extends into her fingers.  The pain is electrical/stabbing when doing something against resistance, otherwise is a dull ache.  She is right-handed and has been having issues with lifting activities, noting she does not trust her arm to support 5 y/o grandson, cup of coffee.  She notes it hurts to use her right arm to cook. She denies trauma to her neck or any other recent injuries.  She has never had surgery on her neck.  She denies any swelling, or bruising noted in her arm.        She also reports left hand numbness when she wakes up in the morning.  She is unsure of the specific fingers.  She has a middle finger trigger and notes the webb tendon is painful.     Conservative Management:  Arm sleeve - somewhat helpful  Ibuprofen PRN - helps a little  Chiro - not in last 4-5 months - for low back pain/adjustments. Has had adjustments for her neck because she carries her stress there.      Past Medical History:   Diagnosis Date    ASCUS with positive high risk HPV cervical 1/24/2018    Breast cancer (H) 2006    Heart murmur        Past Surgical History:   Procedure Laterality Date  "   BIOPSY BREAST Right 2005    CHOLECYSTECTOMY      EXCISE BREAST CYST/FIBROADENOMA/TUMOR/DUCT LESION/NIPPLE LESION/AREOLAR LESION      Description: Breast Surgery Lumpectomy;  Recorded: 05/01/2012;    LUMPECTOMY BREAST      ZZC LAP,CHOLECYSTECTOMY/EXPLORE      Description: Cholecystectomy Laparoscopic;  Recorded: 11/17/2008;       Social History     Socioeconomic History    Marital status:    Tobacco Use    Smoking status: Never    Smokeless tobacco: Never       family history includes Breast Cancer (age of onset: 60.00) in her paternal aunt; Coronary Artery Disease in her father; Pancreatic Cancer in her mother.       IMAGING   Imaging independently reviewed.    XR Cervical Spine 2/3 Views 4/15/2023 - straight cervical spine, no concerning alignment issues.  Mild disc degeneration in lower cervical spine.       IMPRESSION: Straightening of the expected cervical lordosis with 1 mm anterior position C4-C5. Patent airway and appropriate cervical prevertebral soft tissues. No acute fracture or posttraumatic malalignment. Lung apices are clear. Cervical spine is otherwise negative.     Nutritional Status:  Estimated body mass index is 38.4 kg/m  as calculated from the following:    Height as of 10/31/22: 1.562 m (5' 1.5\").    Weight as of 4/15/23: 93.7 kg (206 lb 9.6 oz).    Lab Results   Component Value Date    ALBUMIN 3.9 09/23/2021       Diabetes Screening:  Lab Results   Component Value Date    A1C 6.0 05/01/2012        Physical Exam   There were no vitals taken for this visit.    Constitutional: Appears well-developed and well-nourished. Cooperative. No acute distress.   HENT:   Head: Normocephalic and atraumatic.   Eyes: Conjunctivae are normal.  Neck: Neck supple. No tracheal deviation present.  Cardiovascular: Normal rate and regular rhythm.    Pulmonary/Chest: Effort normal and breath sounds normal.  Abdominal: Exhibits no obvious distension.   Musculoskeletal: Able to move all extremities.  No " involuntary motor movements. No C/T/L spine tenderness to palpation.    Cervical flexion-extension range of motion: FROM w/o pain, negative Spurling test  Skin: Skin is warm, dry and intact.   Psychiatric: Normal mood and affect. Speech is normal and behavior is normal.    Neurological:  Alert, NAD, and oriented to person, place, and time.   No cranial nerve deficit   Gait: steady, symmetrical    Strength (L/R)  5/5 Deltoid  5/5 Bicep - px in bicep on right with motor testing  5/5 Tricep  5/5 Handgrip    Reflexes (L/R)  1+/1+ Bicep  1+/1+ Brachioradialis    No Lhermitte's  No Spurling's  No Arianna's     Sensation: SILT    Negative shoulder ROM testing, no TTP shoulder; pain noted in bicep with Hawkin's testing     ASSESSMENT:  Sierra Danielle is a 63 year old female with about 2 months of RUE pain in the bicep and forearm areas.  She has only had a cervical XR which in my opinion has only mild degenerative changes and overall looks very good for a person her age.  She denies neck pain and the RUE pain is not reproducible with movement of her head, but is with motor testing.      DDx is bicep tendinopathy vs. radiculopathy.   I cannot r/o radiculopathy without a cervical MRI, however.  Patient expressed the desire to try PT before getting an MRI and further noted she is not interested in having surgery.  I agree with her choice to do PT.      PLAN:  Referral to PT to work on RUE pain.  Follow up with me in 4-6 weeks to see how she is doing.  If pain does not improve or gets worse, we will consider a cervical MRI at that time.     I spent 30 minutes spent in patient care, independent review and interpretation of medical records/imaging, reviewing old records.       Again, thank you for allowing me to participate in the care of your patient.      Sincerely,    Lorin Valdez PA-C

## 2023-05-03 NOTE — PATIENT INSTRUCTIONS
PT order placed - they should call you in the next couple of days.      Follow up with Lorin Valdez PA-C in 4-6 weeks to see how you are doing.  If no improvement or having more pain/weakness, we will get cervical MRI.

## 2023-05-03 NOTE — PROGRESS NOTES
Neurosurgery Clinic Note    Chief Complaint: right arm pain    History of Present Illness:  It was a pleasure to evaluate Sierra Danielle in clinic today at the kind referral of Baljinder Payne PA-C  [address] [place] [address].    Sierra Danielle is a 63 year old female who is presenting for evaluation of RUE pain.      Onset was 2 months ago after lifting grandson.  Pain is currently dull, but constant in the bicep area of her right arm.  She sometimes has it in her forearm as well, but it never extends into her fingers.  The pain is electrical/stabbing when doing something against resistance, otherwise is a dull ache.  She is right-handed and has been having issues with lifting activities, noting she does not trust her arm to support 5 y/o grandson, cup of coffee.  She notes it hurts to use her right arm to cook. She denies trauma to her neck or any other recent injuries.  She has never had surgery on her neck.  She denies any swelling, or bruising noted in her arm.        She also reports left hand numbness when she wakes up in the morning.  She is unsure of the specific fingers.  She has a middle finger trigger and notes the webb tendon is painful.     Conservative Management:  Arm sleeve - somewhat helpful  Ibuprofen PRN - helps a little  Chiro - not in last 4-5 months - for low back pain/adjustments. Has had adjustments for her neck because she carries her stress there.      Past Medical History:   Diagnosis Date     ASCUS with positive high risk HPV cervical 1/24/2018     Breast cancer (H) 2006     Heart murmur        Past Surgical History:   Procedure Laterality Date     BIOPSY BREAST Right 2005     CHOLECYSTECTOMY       EXCISE BREAST CYST/FIBROADENOMA/TUMOR/DUCT LESION/NIPPLE LESION/AREOLAR LESION      Description: Breast Surgery Lumpectomy;  Recorded: 05/01/2012;     LUMPECTOMY BREAST       ZZC LAP,CHOLECYSTECTOMY/EXPLORE      Description: Cholecystectomy Laparoscopic;  Recorded:  "11/17/2008;       Social History     Socioeconomic History     Marital status:    Tobacco Use     Smoking status: Never     Smokeless tobacco: Never       family history includes Breast Cancer (age of onset: 60.00) in her paternal aunt; Coronary Artery Disease in her father; Pancreatic Cancer in her mother.       IMAGING   Imaging independently reviewed.    XR Cervical Spine 2/3 Views 4/15/2023 - straight cervical spine, no concerning alignment issues.  Mild disc degeneration in lower cervical spine.       IMPRESSION: Straightening of the expected cervical lordosis with 1 mm anterior position C4-C5. Patent airway and appropriate cervical prevertebral soft tissues. No acute fracture or posttraumatic malalignment. Lung apices are clear. Cervical spine is otherwise negative.     Nutritional Status:  Estimated body mass index is 38.4 kg/m  as calculated from the following:    Height as of 10/31/22: 1.562 m (5' 1.5\").    Weight as of 4/15/23: 93.7 kg (206 lb 9.6 oz).    Lab Results   Component Value Date    ALBUMIN 3.9 09/23/2021       Diabetes Screening:  Lab Results   Component Value Date    A1C 6.0 05/01/2012        Physical Exam   There were no vitals taken for this visit.    Constitutional: Appears well-developed and well-nourished. Cooperative. No acute distress.   HENT:   Head: Normocephalic and atraumatic.   Eyes: Conjunctivae are normal.  Neck: Neck supple. No tracheal deviation present.  Cardiovascular: Normal rate and regular rhythm.    Pulmonary/Chest: Effort normal and breath sounds normal.  Abdominal: Exhibits no obvious distension.   Musculoskeletal: Able to move all extremities.  No involuntary motor movements. No C/T/L spine tenderness to palpation.    Cervical flexion-extension range of motion: FROM w/o pain, negative Spurling test  Skin: Skin is warm, dry and intact.   Psychiatric: Normal mood and affect. Speech is normal and behavior is normal.    Neurological:  Alert, NAD, and oriented to " person, place, and time.   No cranial nerve deficit   Gait: steady, symmetrical    Strength (L/R)  5/5 Deltoid  5/5 Bicep - px in bicep on right with motor testing  5/5 Tricep  5/5 Handgrip    Reflexes (L/R)  1+/1+ Bicep  1+/1+ Brachioradialis    No Lhermitte's  No Spurling's  No Arianna's     Sensation: SILT    Negative shoulder ROM testing, no TTP shoulder; pain noted in bicep with Hawkin's testing     ASSESSMENT:  Sierra Danielle is a 63 year old female with about 2 months of RUE pain in the bicep and forearm areas.  She has only had a cervical XR which in my opinion has only mild degenerative changes and overall looks very good for a person her age.  She denies neck pain and the RUE pain is not reproducible with movement of her head, but is with motor testing.      DDx is bicep tendinopathy vs. radiculopathy.   I cannot r/o radiculopathy without a cervical MRI, however.  Patient expressed the desire to try PT before getting an MRI and further noted she is not interested in having surgery.  I agree with her choice to do PT.      PLAN:  Referral to PT to work on RUE pain.  Follow up with me in 4-6 weeks to see how she is doing.  If pain does not improve or gets worse, we will consider a cervical MRI at that time.     I spent 30 minutes spent in patient care, independent review and interpretation of medical records/imaging, reviewing old records.       Lorin Valdez PA-C  Department of Neurosurgery  Office: [phone]

## 2023-06-05 ENCOUNTER — THERAPY VISIT (OUTPATIENT)
Dept: PHYSICAL THERAPY | Facility: REHABILITATION | Age: 63
End: 2023-06-05
Attending: PHYSICIAN ASSISTANT
Payer: COMMERCIAL

## 2023-06-05 DIAGNOSIS — M54.12 CERVICAL RADICULOPATHY: ICD-10-CM

## 2023-06-05 DIAGNOSIS — M79.601 RIGHT ARM PAIN: ICD-10-CM

## 2023-06-05 PROCEDURE — 97110 THERAPEUTIC EXERCISES: CPT | Mod: GP

## 2023-06-05 PROCEDURE — 97161 PT EVAL LOW COMPLEX 20 MIN: CPT | Mod: GP

## 2023-06-05 NOTE — PROGRESS NOTES
PHYSICAL THERAPY EVALUATION  Type of Visit: Evaluation    See electronic medical record for Abuse and Falls Screening details.    Subjective       Raj reports that her right arm lateral forearm (can radiate into lateral upper arm) started to bother her a few months ago without any mechanism of injury. It always bothered her while lifting her grandson into/out of the car, so she thought that maybe that was part of the cause. She eventually went to the doctor who did cervical x-rays which she reports were largely unremarkable. She was then referred to a neurosurgeon who then referred her to physical therapy. She reports near constant pain now especially with lifting, twisting, and crocheting with the right hand. She denies any numbness or tingling in the right arm/hand unless she sleeps on it funny. She does have a trigger finger of the third finger on the left, and she reports numbness in her entire left hand most mornings. She denies any recent bowel or bladder changes. She has tried ibuprofen and aleve without much relief. She has not tried ice or heat, and she said that main thing that helps relieve her symptoms is rest. Worst pain: 10/10. Best pain: 3-4/10. Current pain: 3-4/10. She reports largely unchanged symptoms since onset.  Presenting condition or subjective complaint: Pain with right arm that limits the use (especially lifting)  Date of onset: 03/05/23    Relevant medical history: Cancer; High blood pressure   Dates & types of surgery: lLumpectomy 2006, gallbladder removal    Prior diagnostic imaging/testing results: X-ray     Prior therapy history for the same diagnosis, illness or injury: No        Living Environment  Social support: With family members   Type of home: [facility]   Stairs to enter the home: Yes       Ramp: No   Stairs inside the home: Yes       Help at home: None  Equipment owned:       Employment:   DEI Director at [employer]  Hobbies/Interests: Jamal golf    Patient goals for therapy:  Lift without pain    Pain assessment: see subjective report        Objective   CERVICAL SPINE EVALUATION  PAIN: no neck pain  POSTURE: Sitting Posture: slight forward head  ROM: AROM WNL  MYOTOMES: WNL  DERMATOMES: WNL  NEURAL TENSION:    Left Right   Median Negative  Negative    Ulnar Negative  Negative    Radial  Negative  Negative    Special Tests: spurling's negative bilaterally    SHOULDER EVALUATION  PAIN: see subjective report  ROM:   (Degrees) Left AROM Right AROM    Shoulder Flexion WNL WNL   Shoulder Abduction WNL WNL   Shoulder Internal Rotation WNL Mod limited compared to left (painful)   Shoulder External Rotation WNL WNL   Elbow Extension WNL WNL   Elbow Flexion WNL WNL     STRENGTH:   Pain: - none + mild ++ moderate +++ severe  Strength Scale: 0-5/5 Left Right   Shoulder Flexion 5 5-, ++ (mod)   Shoulder Abduction 5 5-, + (mild)   Shoulder Internal Rotation 5 5, + (mild)   Shoulder External Rotation 5 5-, + (mild)   Elbow Flexion 5 5-, ++ (mod)   Elbow Extension 5 5, + (mild)   -  strength: right- 42.3 pounds; left - 33.7 pounds  Special Tests:    Left Right   Impingement     Neer's Negative  Negative    Hawkin's-Casey Negative  Negative    Biceps      Speed's Negative  Positive   - Yergason's, resisted wrist extension, wrist extensors stretch: positive on right; negative on left  - Tom's: negative bilaterally  PALPATION: Tender to palpation along wrist extensor musculature and at lateral epicondyle into lateral distal upper arm. Also tender to palpation at biceps tendon long head.      Assessment & Plan   CLINICAL IMPRESSIONS   Medical Diagnosis: Cervical radiculopathy  Right arm pain    Treatment Diagnosis: Right elbow and shoulder pain with strength and mobility deficits   Impression/Assessment: Patient is a 63 year old female with right elbow/forearm and shoulder/upper arm pain complaints.  The following significant findings have been identified: Pain, Decreased ROM/flexibility, Decreased  joint mobility, Decreased strength, Impaired muscle performance, Decreased activity tolerance and Impaired posture. These impairments interfere with their ability to perform self care tasks, work tasks, recreational activities and household chores as compared to previous level of function.     Clinical Decision Making (Complexity):   Clinical Presentation: Stable/Uncomplicated  Clinical Presentation Rationale: based on medical and personal factors listed in PT evaluation  Clinical Decision Making (Complexity): Low complexity    PLAN OF CARE  Treatment Interventions:  Modalities: Cryotherapy, E-stim, Ultrasound  Interventions: Manual Therapy, Neuromuscular Re-education, Therapeutic Activity, Therapeutic Exercise, Self-Care/Home Management    Long Term Goals     PT Goal 1  Goal Identifier: HEP  Goal Description: Raj will demonstrate mastery of and compliance with her home exercise program to facilitate increased rate of improvement.  Goal Progress: In progress  Target Date: 07/03/23  PT Goal 2  Goal Identifier: Lifting  Goal Description: Raj will demonstrate improved tolerance to functional activities as evidenced by her ability to lift her grandson into/out of car with self-report pain no higher than 2/10.  Goal Progress: Unable  Target Date: 07/31/23  PT Goal 3  Goal Identifier: Jerica  Goal Description: Raj will demonstrate improved tolerance to desired recreational activities as evidenced by her ability to jerica for at least 45 minutes with self-report pain no higher than 2/10.  Goal Progress: Unable  Target Date: 07/31/23      Frequency of Treatment: 1 time/week  Duration of Treatment: 8 weeks    Recommended Referrals to Other Professionals: none  Education Assessment:   Learner/Method: Patient;Listening;Demonstration;Pictures/Video;No Barriers to Learning    Risks and benefits of evaluation/treatment have been explained.   Patient/Family/caregiver agrees with Plan of Care.     Evaluation Time:     PT  Rosa, Low Complexity Minutes (47444): 28    Signing Clinician: Venkatesh Acevedo, PT

## 2023-06-12 ENCOUNTER — THERAPY VISIT (OUTPATIENT)
Dept: PHYSICAL THERAPY | Facility: REHABILITATION | Age: 63
End: 2023-06-12
Attending: PHYSICIAN ASSISTANT
Payer: COMMERCIAL

## 2023-06-12 DIAGNOSIS — M54.12 CERVICAL RADICULOPATHY: Primary | ICD-10-CM

## 2023-06-12 PROCEDURE — 97140 MANUAL THERAPY 1/> REGIONS: CPT | Mod: GP

## 2023-06-12 PROCEDURE — 97110 THERAPEUTIC EXERCISES: CPT | Mod: GP

## 2023-06-19 ENCOUNTER — THERAPY VISIT (OUTPATIENT)
Dept: PHYSICAL THERAPY | Facility: REHABILITATION | Age: 63
End: 2023-06-19
Attending: PHYSICIAN ASSISTANT
Payer: COMMERCIAL

## 2023-06-19 DIAGNOSIS — M54.12 CERVICAL RADICULOPATHY: Primary | ICD-10-CM

## 2023-06-19 PROCEDURE — 97110 THERAPEUTIC EXERCISES: CPT | Mod: GP

## 2023-06-19 PROCEDURE — 97140 MANUAL THERAPY 1/> REGIONS: CPT | Mod: GP

## 2023-06-27 ENCOUNTER — THERAPY VISIT (OUTPATIENT)
Dept: PHYSICAL THERAPY | Facility: REHABILITATION | Age: 63
End: 2023-06-27
Payer: COMMERCIAL

## 2023-06-27 DIAGNOSIS — M54.12 CERVICAL RADICULOPATHY: Primary | ICD-10-CM

## 2023-06-27 PROCEDURE — 97140 MANUAL THERAPY 1/> REGIONS: CPT | Mod: GP

## 2023-06-27 PROCEDURE — 97110 THERAPEUTIC EXERCISES: CPT | Mod: GP

## 2023-07-03 ENCOUNTER — THERAPY VISIT (OUTPATIENT)
Dept: PHYSICAL THERAPY | Facility: REHABILITATION | Age: 63
End: 2023-07-03
Payer: COMMERCIAL

## 2023-07-03 DIAGNOSIS — M79.601 RIGHT ARM PAIN: ICD-10-CM

## 2023-07-03 DIAGNOSIS — M54.12 CERVICAL RADICULOPATHY: Primary | ICD-10-CM

## 2023-07-03 PROCEDURE — 97140 MANUAL THERAPY 1/> REGIONS: CPT | Mod: GP

## 2023-07-03 PROCEDURE — 97110 THERAPEUTIC EXERCISES: CPT | Mod: GP

## 2023-07-03 NOTE — PROGRESS NOTES
PLAN  Continue therapy per current plan of care.    Beginning/End Dates of Progress Note Reporting Period:  06/05/2023 to 07/03/2023    Referring Provider:  Lorin Valdez           07/03/23 0500   Appointment Info   Signing clinician's name / credentials Venkatesh Acevedo PT   Total/Authorized Visits 8   Visits Used 5   Medical Diagnosis Cervical radiculopathy  Right arm pain   PT Tx Diagnosis Right elbow and shoulder pain with strength and mobility deficits   Progress Note/Certification   Start of Care Date 06/05/23   Onset of illness/injury or Date of Surgery 03/05/23   Therapy Frequency 1 time/week   Predicted Duration 8 weeks   Progress Note Due Date 07/26/23   Progress Note Completed Date 07/03/23   PT Goal 1   Goal Identifier HEP   Goal Description Raj will demonstrate mastery of and compliance with her home exercise program to facilitate increased rate of improvement.   Goal Progress Met   Target Date 07/03/23   Date Met 07/03/23   PT Goal 2   Goal Identifier Lifting   Goal Description Raj will demonstrate improved tolerance to functional activities as evidenced by her ability to lift her grandson into/out of car with self-report pain no higher than 2/10.   Goal Progress Still unattempted   Target Date 07/31/23   PT Goal 3   Goal Identifier Lili   Goal Description Raj will demonstrate improved tolerance to desired recreational activities as evidenced by her ability to lili for at least 45 minutes with self-report pain no higher than 2/10.   Goal Progress 1 hour, depending on certain stitches   Target Date 07/31/23   Date Met 07/03/23   Subjective Report   Subjective Report Raj reports that things had been going well, but then she was swimming with her grandson, and he pulled on her injured arm, causing increased pain.   Objective Measures   Objective Measures Objective Measure 1;Objective Measure 2;Objective Measure 3;Objective Measure 4   Objective Measure 1   Objective Measure Right Shoulder  and Elbow/wrist Strength   Details Minimally pain-limited in shoulder flexion and IR (ER and abduction 5/5 and pain-free). Moderately pain-limited with elbow flexion. Elbow and wrist extension pain-free and WNL.   Objective Measure 2   Objective Measure Right Shoulder IR AROM   Details Equal bilaterally and WNL (min pain)   Objective Measure 3   Objective Measure Worst Pain   Details Preceding week: 7/10 (when grandguanako pulled on arm)   Therapeutic Procedure/Exercise   Therapeutic Procedures: strength, endurance, ROM, flexibillity minutes (03164) 26   Therapeutic Procedures Ther Proc 2;Ther Proc 3;Ther Proc 4;Ther Proc 5   Ther Proc 1 Arm bike   Ther Proc 1 - Details 2.5 minutes forward + 2.5 minutes reverese   Ther Proc 2 Resisted wrist extension eccentric   Ther Proc 2 - Details 2 x 12 (red band)   Ther Proc 4 Objective measures   Ther Proc 4 - Details See objective measures   Ther Proc 5 Alternating ulnar/radial deviation   Ther Proc 5 - Details 1 x 12-15 (3 pounds)   Ther Proc 6 Alternating supination/pronation   Ther Proc 6 - Details 1 x 12-15 (3 pounds)   Skilled Intervention Exercises to improve elbow, shoulder, and arm flexibility and strength.   Patient Response/Progress Increased reps. Raj tolerated all exercises well with cueing provided for proper form and performance.   Manual Therapy   Manual Therapy: Mobilization, MFR, MLD, friction massage minutes ([id number]) 9   Manual Therapy 1 STM   Manual Therapy 1 - Details STM with min pressure to wrist extensors and lateral distal upper arm musculature in sitting   Skilled Intervention STM to decreased pain   Patient Response/Progress Raj verbalized symptom relief during and following intervention   Education   Learner/Method Patient;Listening;Demonstration;Pictures/Video;No Barriers to Learning   Plan   Home program See PTRx.   Plan for next session Continue to progress wrist/elbow, shoulder/scapular, and upper back flexibility, mobility, and  strength/stability exercises. Cotinue manual therapy as appropriate.   Comments   Comments Assessment: Raj continues to report overall improvement, with pain now only ocurring with resisted motion, though she does report recent exacerbation when her grandson pulled on it in the pool. She demonstrates notably improved right shoulder, elbow, and wrist strength and ROM, though her right elbow flexion and shoulder flexion and IR are still pain-limited. She is now able to jerica for up to 60 minutes with minimal pain. She has not yet attempted lifting her grandson. She will continue to benefit from physical therapy to progress toward her remaining goals and improve her function.

## 2023-07-20 ENCOUNTER — THERAPY VISIT (OUTPATIENT)
Dept: PHYSICAL THERAPY | Facility: REHABILITATION | Age: 63
End: 2023-07-20
Payer: COMMERCIAL

## 2023-07-20 DIAGNOSIS — M79.601 RIGHT ARM PAIN: ICD-10-CM

## 2023-07-20 DIAGNOSIS — M54.12 CERVICAL RADICULOPATHY: Primary | ICD-10-CM

## 2023-07-20 PROCEDURE — 97110 THERAPEUTIC EXERCISES: CPT | Mod: GP

## 2023-07-20 PROCEDURE — 97140 MANUAL THERAPY 1/> REGIONS: CPT | Mod: GP

## 2023-08-04 ENCOUNTER — THERAPY VISIT (OUTPATIENT)
Dept: PHYSICAL THERAPY | Facility: REHABILITATION | Age: 63
End: 2023-08-04
Payer: COMMERCIAL

## 2023-08-04 DIAGNOSIS — M54.12 CERVICAL RADICULOPATHY: Primary | ICD-10-CM

## 2023-08-04 PROCEDURE — 97110 THERAPEUTIC EXERCISES: CPT | Mod: GP

## 2023-08-04 NOTE — PROGRESS NOTES
DISCHARGE  Reason for Discharge: Patient has met all goals.    Equipment Issued: Elastic band.    Discharge Plan: Patient to continue home program.    Referring Provider:  Lorin Valdez           08/04/23 0500   Appointment Info   Signing clinician's name / credentials Venkatesh Acevedo PT   Total/Authorized Visits 8   Visits Used 7   Medical Diagnosis Cervical radiculopathy  Right arm pain   PT Tx Diagnosis Right elbow and shoulder pain with strength and mobility deficits   Progress Note/Certification   Start of Care Date 06/05/23   Onset of illness/injury or Date of Surgery 03/05/23   Therapy Frequency 1 time/week   Predicted Duration 8 weeks   Progress Note Due Date 07/26/23   Progress Note Completed Date 07/03/23   PT Goal 1   Goal Identifier HEP   Goal Description Raj will demonstrate mastery of and compliance with her home exercise program to facilitate increased rate of improvement.   Goal Progress Met   Target Date 07/03/23   Date Met 07/03/23   PT Goal 2   Goal Identifier Lifting   Goal Description Raj will demonstrate improved tolerance to functional activities as evidenced by her ability to lift her grandson into/out of car with self-report pain no higher than 2/10.   Goal Progress No longer applicable   Target Date 07/31/23   PT Goal 3   Goal Identifier Lili   Goal Description Raj will demonstrate improved tolerance to desired recreational activities as evidenced by her ability to lili for at least 45 minutes with self-report pain no higher than 2/10.   Goal Progress 1+ hour   Target Date 07/31/23   Date Met 07/03/23   Subjective Report   Subjective Report Raj reports that she can't remembre the last time that her arm hurt. She reports feeling like she has seen approximately 98% improvement overall.   Objective Measures   Objective Measures Objective Measure 1;Objective Measure 2;Objective Measure 3;Objective Measure 4   Objective Measure 1   Objective Measure Right Shoulder and Elbow/wrist  Strength   Details Minimally pain-limited in shoulder flexion and IR (ER and abduction 5/5 and pain-free). Moderately pain-limited with elbow flexion. Elbow and wrist extension pain-free and WNL.   Objective Measure 2   Objective Measure Right Shoulder IR AROM   Details Equal bilaterally and WNL (min pain)   Objective Measure 3   Objective Measure Worst Pain   Details Since last visit: 0/10   Therapeutic Procedure/Exercise   Therapeutic Procedures: strength, endurance, ROM, flexibillity minutes (43143) 25   Therapeutic Procedures Ther Proc 2;Ther Proc 3;Ther Proc 4;Ther Proc 5   Ther Proc 1 Arm bike + subjective report   Ther Proc 1 - Details 2.5 minutes forward + 2.5 minutes reverese   Ther Proc 2 Resisted wrist extension alternating concentric/eccentric   Ther Proc 2 - Details 3 x 12 (green band)   Ther Proc 3 Discussed discharge plan and home exercise program   Ther Proc 5 Alternating ulnar/radial deviation   Ther Proc 5 - Details 2 x 15 (4 pounds)   Ther Proc 6 Alternating supination/pronation   Ther Proc 6 - Details 1 x 15 (3 pound bar held 1/3 up)   Skilled Intervention Exercises to improve elbow, shoulder, and arm flexibility and strength.   Patient Response/Progress Added concentric wrist extension; increased sets/reps. Raj tolerated all exercises well with cueing provided for proper form and performance.   Education   Learner/Method Patient;Listening;Demonstration;Pictures/Video;No Barriers to Learning   Plan   Home program See PTRx.   Plan for next session Continue to progress wrist/elbow, shoulder/scapular, and upper back flexibility, mobility, and strength/stability exercises. Cotinue manual therapy as appropriate.   Comments   Comments Assessment: Raj said she can't remember the last time her arm hurt, and she reports feeling like she has seen approximately 98% improvement overall since start of physical therapy. She is now able to jerica for 1+hours without increased symptoms. She no longer needs  to  her grandson, so that goal is no longer applicable. She is compliant with her home exercises and demonstrates good form. She now reports feeling ready to discharge to a home exercise/maintenance program.   Total Session Time   Timed Code Treatment Minutes 25   Total Treatment Time (sum of timed and untimed services) 25

## 2023-08-15 ENCOUNTER — OFFICE VISIT (OUTPATIENT)
Dept: FAMILY MEDICINE | Facility: CLINIC | Age: 63
End: 2023-08-15
Payer: COMMERCIAL

## 2023-08-15 VITALS
HEIGHT: 61 IN | BODY MASS INDEX: 38.91 KG/M2 | TEMPERATURE: 98 F | SYSTOLIC BLOOD PRESSURE: 130 MMHG | DIASTOLIC BLOOD PRESSURE: 81 MMHG | RESPIRATION RATE: 16 BRPM | OXYGEN SATURATION: 96 % | WEIGHT: 206.1 LBS | HEART RATE: 90 BPM

## 2023-08-15 DIAGNOSIS — M65.332 TRIGGER MIDDLE FINGER OF LEFT HAND: Primary | ICD-10-CM

## 2023-08-15 PROCEDURE — 99213 OFFICE O/P EST LOW 20 MIN: CPT | Performed by: NURSE PRACTITIONER

## 2023-08-15 NOTE — PROGRESS NOTES
"  Assessment & Plan     Trigger middle finger of left hand  Patient is looking for injection for trigger finger.  I discussed with patient that I do not perform this.  I have placed a referral to Ortho for further evaluation.  I did also recommend walk-in care at [facility].  I told her I was not sure if they did injections at the walk-in clinic but it could be worth trying.  - Orthopedic  Referral; Future             BMI:   Estimated body mass index is 38.32 kg/m  as calculated from the following:    Height as of this encounter: 1.562 m (5' 1.5\").    Weight as of this encounter: 93.5 kg (206 lb 1.6 oz).           MELVIN HARRISON CNP  [facility]   Raj is a 63 year old, presenting for the following health issues:  Trigger Finger (Trigger finger of left middle finger for past few months. Was referred by PT to ask for a cortisone shot. )      8/15/2023     7:48 AM   Additional Questions   Roomed by Nakia DALTON       History of Present Illness       Reason for visit:  Trigger finger seen in walk-in clinic and discussed with PT    She eats 2-3 servings of fruits and vegetables daily.She consumes 0 sweetened beverage(s) daily.She exercises with enough effort to increase her heart rate 9 or less minutes per day.  She exercises with enough effort to increase her heart rate 3 or less days per week.   She is taking medications regularly.               Review of Systems         Objective    /81 (BP Location: Right arm, Patient Position: Sitting, Cuff Size: Adult Regular)   Pulse 90   Temp 98  F (36.7  C) (Oral)   Resp 16   Ht 1.562 m (5' 1.5\")   Wt 93.5 kg (206 lb 1.6 oz)   SpO2 96%   BMI 38.32 kg/m    Body mass index is 38.32 kg/m .  Physical Exam  Constitutional:       Appearance: Normal appearance.   Musculoskeletal:      Left hand: No swelling. Decreased range of motion (Mmiddle finger able to bend but it gets stuck.  Able to bend all the way down to " palm).   Neurological:      General: No focal deficit present.      Mental Status: She is alert and oriented to person, place, and time.   Psychiatric:         Mood and Affect: Mood normal.         Behavior: Behavior normal.

## 2023-08-18 ENCOUNTER — TRANSFERRED RECORDS (OUTPATIENT)
Dept: HEALTH INFORMATION MANAGEMENT | Facility: CLINIC | Age: 63
End: 2023-08-18
Payer: COMMERCIAL

## 2023-10-02 ENCOUNTER — PATIENT OUTREACH (OUTPATIENT)
Dept: CARE COORDINATION | Facility: CLINIC | Age: 63
End: 2023-10-02
Payer: COMMERCIAL

## 2023-10-05 ENCOUNTER — PATIENT OUTREACH (OUTPATIENT)
Dept: FAMILY MEDICINE | Facility: CLINIC | Age: 63
End: 2023-10-05
Payer: COMMERCIAL

## 2023-10-16 ENCOUNTER — PATIENT OUTREACH (OUTPATIENT)
Dept: CARE COORDINATION | Facility: CLINIC | Age: 63
End: 2023-10-16
Payer: COMMERCIAL

## 2023-12-13 NOTE — TELEPHONE ENCOUNTER
Juan David Serrano,   Sending to you as a fyi since Dr. Roseline Velarde is no longer with [facility]. Patient is lost to pap tracking follow-up. Attempts to contact pt have been made per reminder process and there has been no reply and/or no appt scheduled.       Pap Hx:  1/24/18 ASCUS, +HR HPV (not 16/18)  9/16/20 NIL pap, +HR HPV (not 16/18). Plan: cotest in 1 year per provider  9/23/21 NIL pap, +HR HPV (not 16/18). Plan: colposcopy due before 12/23/21  10/12/21 Colpo ECC non diagnostic, bx small focus of atypia and koilocytosis suggestive of HPV effect (exam not satisfactory per office visit note). Plan: cotest in 1 year  09/21/22 Reminder Mychart, Letter  10/31/22 NIL Pap, Neg HR HPV Plan cotest in 1 year due 10/31/23  10/5/23 Reminder Mychart  11/08/23 Reminder call-lm  12/13/23 Lost to follow-up for pap tracking, fyi routed to provider

## 2023-12-23 ENCOUNTER — HEALTH MAINTENANCE LETTER (OUTPATIENT)
Age: 63
End: 2023-12-23

## 2024-03-02 ENCOUNTER — HEALTH MAINTENANCE LETTER (OUTPATIENT)
Age: 64
End: 2024-03-02

## 2025-01-12 ENCOUNTER — HEALTH MAINTENANCE LETTER (OUTPATIENT)
Age: 65
End: 2025-01-12

## 2025-03-15 ENCOUNTER — HEALTH MAINTENANCE LETTER (OUTPATIENT)
Age: 65
End: 2025-03-15

## 2025-04-26 ENCOUNTER — HEALTH MAINTENANCE LETTER (OUTPATIENT)
Age: 65
End: 2025-04-26